# Patient Record
Sex: MALE | Race: WHITE | NOT HISPANIC OR LATINO | URBAN - METROPOLITAN AREA
[De-identification: names, ages, dates, MRNs, and addresses within clinical notes are randomized per-mention and may not be internally consistent; named-entity substitution may affect disease eponyms.]

---

## 2018-01-01 ENCOUNTER — HOSPITAL ENCOUNTER (INPATIENT)
Dept: INTENSIVE CARE | Facility: HOSPITAL | Age: 83
End: 2018-08-15
Attending: INTERNAL MEDICINE | Admitting: INTERNAL MEDICINE

## 2018-01-01 ENCOUNTER — RECORDS - HEALTHEAST (OUTPATIENT)
Dept: LAB | Facility: CLINIC | Age: 83
End: 2018-01-01

## 2018-01-01 DIAGNOSIS — N17.9 ACUTE KIDNEY INJURY (H): ICD-10-CM

## 2018-01-01 DIAGNOSIS — E87.5 HYPERKALEMIA: ICD-10-CM

## 2018-01-01 DIAGNOSIS — I95.9 HYPOTENSION, UNSPECIFIED HYPOTENSION TYPE: ICD-10-CM

## 2018-01-01 DIAGNOSIS — R00.0 WIDE-COMPLEX TACHYCARDIA: ICD-10-CM

## 2018-01-01 DIAGNOSIS — J96.90 RESPIRATORY FAILURE (H): ICD-10-CM

## 2018-01-01 LAB
ALBUMIN SERPL-MCNC: 1.8 G/DL (ref 3.5–5)
ALBUMIN SERPL-MCNC: 2 G/DL (ref 3.5–5)
ALBUMIN SERPL-MCNC: 2.6 G/DL (ref 3.5–5)
ALP SERPL-CCNC: 219 U/L (ref 45–120)
ALP SERPL-CCNC: 251 U/L (ref 45–120)
ALP SERPL-CCNC: 614 U/L (ref 45–120)
ALT SERPL W P-5'-P-CCNC: 1654 U/L (ref 0–45)
ALT SERPL W P-5'-P-CCNC: 2210 U/L (ref 0–45)
ALT SERPL W P-5'-P-CCNC: 3183 U/L (ref 0–45)
ANION GAP SERPL CALCULATED.3IONS-SCNC: 16 MMOL/L (ref 5–18)
ANION GAP SERPL CALCULATED.3IONS-SCNC: 20 MMOL/L (ref 5–18)
ANION GAP SERPL CALCULATED.3IONS-SCNC: 26 MMOL/L (ref 5–18)
ANION GAP SERPL CALCULATED.3IONS-SCNC: >28 MMOL/L (ref 5–18)
AST SERPL W P-5'-P-CCNC: 3830 U/L (ref 0–40)
AST SERPL W P-5'-P-CCNC: 6117 U/L (ref 0–40)
AST SERPL W P-5'-P-CCNC: ABNORMAL U/L (ref 0–40)
ATRIAL RATE - MUSE: 234 BPM
ATRIAL RATE - MUSE: 256 BPM
BASE EXCESS BLDA CALC-SCNC: -11.7 MMOL/L
BASE EXCESS BLDA CALC-SCNC: -21.5 MMOL/L
BASE EXCESS BLDA CALC-SCNC: -24.8 MMOL/L
BASE EXCESS BLDA CALC-SCNC: -25.1 MMOL/L
BASE EXCESS BLDV CALC-SCNC: -5.6 MMOL/L
BILIRUB DIRECT SERPL-MCNC: 0.9 MG/DL
BILIRUB DIRECT SERPL-MCNC: 1 MG/DL
BILIRUB SERPL-MCNC: 1.4 MG/DL (ref 0–1)
BILIRUB SERPL-MCNC: 1.6 MG/DL (ref 0–1)
BILIRUB SERPL-MCNC: 1.9 MG/DL (ref 0–1)
BUN SERPL-MCNC: 22 MG/DL (ref 8–28)
BUN SERPL-MCNC: 31 MG/DL (ref 8–28)
BUN SERPL-MCNC: 68 MG/DL (ref 8–28)
BUN SERPL-MCNC: 75 MG/DL (ref 8–28)
CALCIUM SERPL-MCNC: 7.4 MG/DL (ref 8.5–10.5)
CALCIUM SERPL-MCNC: 7.7 MG/DL (ref 8.5–10.5)
CALCIUM SERPL-MCNC: 8.2 MG/DL (ref 8.5–10.5)
CALCIUM SERPL-MCNC: 9.7 MG/DL (ref 8.5–10.5)
CHLORIDE BLD-SCNC: 89 MMOL/L (ref 98–107)
CHLORIDE BLD-SCNC: 97 MMOL/L (ref 98–107)
CHLORIDE BLD-SCNC: 98 MMOL/L (ref 98–107)
CHLORIDE BLD-SCNC: 99 MMOL/L (ref 98–107)
CK SERPL-CCNC: 1459 U/L (ref 30–190)
CO2 SERPL-SCNC: 20 MMOL/L (ref 22–31)
CO2 SERPL-SCNC: 7 MMOL/L (ref 22–31)
CO2 SERPL-SCNC: 8 MMOL/L (ref 22–31)
CO2 SERPL-SCNC: <6 MMOL/L (ref 22–31)
COHGB MFR BLD: 91.6 % (ref 95–96)
COHGB MFR BLD: 95.4 % (ref 95–96)
COHGB MFR BLD: 98 % (ref 95–96)
COHGB MFR BLD: 99.6 % (ref 95–96)
CREAT SERPL-MCNC: 1.07 MG/DL (ref 0.7–1.3)
CREAT SERPL-MCNC: 2.25 MG/DL (ref 0.7–1.3)
CREAT SERPL-MCNC: 2.76 MG/DL (ref 0.7–1.3)
CREAT SERPL-MCNC: 3.14 MG/DL (ref 0.7–1.3)
DIASTOLIC BLOOD PRESSURE - MUSE: NORMAL MMHG
DIASTOLIC BLOOD PRESSURE - MUSE: NORMAL MMHG
ERYTHROCYTE [DISTWIDTH] IN BLOOD BY AUTOMATED COUNT: 17.8 % (ref 11–14.5)
ERYTHROCYTE [DISTWIDTH] IN BLOOD BY AUTOMATED COUNT: 18.6 % (ref 11–14.5)
GFR SERPL CREATININE-BSD FRML MDRD: 19 ML/MIN/1.73M2
GFR SERPL CREATININE-BSD FRML MDRD: 22 ML/MIN/1.73M2
GFR SERPL CREATININE-BSD FRML MDRD: 28 ML/MIN/1.73M2
GFR SERPL CREATININE-BSD FRML MDRD: >60 ML/MIN/1.73M2
GLUCOSE BLD-MCNC: 176 MG/DL (ref 70–125)
GLUCOSE BLD-MCNC: 44 MG/DL (ref 70–125)
GLUCOSE BLD-MCNC: 56 MG/DL (ref 70–125)
GLUCOSE BLD-MCNC: 84 MG/DL (ref 70–125)
GLUCOSE BLDC GLUCOMTR-MCNC: 101 MG/DL
GLUCOSE BLDC GLUCOMTR-MCNC: 116 MG/DL
GLUCOSE BLDC GLUCOMTR-MCNC: 121 MG/DL
GLUCOSE BLDC GLUCOMTR-MCNC: 125 MG/DL
GLUCOSE BLDC GLUCOMTR-MCNC: 127 MG/DL
GLUCOSE BLDC GLUCOMTR-MCNC: 140 MG/DL
GLUCOSE BLDC GLUCOMTR-MCNC: 153 MG/DL
GLUCOSE BLDC GLUCOMTR-MCNC: 157 MG/DL
GLUCOSE BLDC GLUCOMTR-MCNC: 44 MG/DL
GLUCOSE BLDC GLUCOMTR-MCNC: 46 MG/DL
GLUCOSE BLDC GLUCOMTR-MCNC: 66 MG/DL
GLUCOSE BLDC GLUCOMTR-MCNC: 74 MG/DL
GLUCOSE BLDC GLUCOMTR-MCNC: 92 MG/DL
GLUCOSE BLDC GLUCOMTR-MCNC: 95 MG/DL
HBA1C MFR BLD: 6.6 % (ref 4.2–6.1)
HBV SURFACE AG SERPL QL IA: NEGATIVE
HCO3, ARTERIAL CALC - HISTORICAL: 15.7 MMOL/L (ref 23–29)
HCO3, ARTERIAL CALC - HISTORICAL: 5.3 MMOL/L (ref 23–29)
HCO3, ARTERIAL CALC - HISTORICAL: 5.6 MMOL/L (ref 23–29)
HCO3, ARTERIAL CALC - HISTORICAL: 8.2 MMOL/L (ref 23–29)
HCO3, VENOUS, CALC - HISTORICAL: 19.4 MMOL/L (ref 24–30)
HCT VFR BLD AUTO: 30.1 % (ref 40–54)
HCT VFR BLD AUTO: 41 % (ref 40–54)
HGB BLD-MCNC: 13 G/DL (ref 14–18)
HGB BLD-MCNC: 7.5 G/DL (ref 14–18)
HGB BLD-MCNC: 9.5 G/DL (ref 14–18)
INR PPP: 2.31 (ref 0.9–1.1)
INR PPP: 2.68 (ref 0.9–1.1)
INR PPP: 4.46 (ref 0.9–1.1)
INR PPP: 7.02 (ref 0.9–1.1)
INTERPRETATION ECG - MUSE: NORMAL
INTERPRETATION ECG - MUSE: NORMAL
LACTATE SERPL-SCNC: 12.2 MMOL/L (ref 0.5–2.2)
LACTATE SERPL-SCNC: 14.3 MMOL/L (ref 0.5–2.2)
LACTATE SERPL-SCNC: 25.5 MMOL/L (ref 0.5–2.2)
LACTATE SERPL-SCNC: 9.6 MMOL/L (ref 0.5–2.2)
MAGNESIUM SERPL-MCNC: 2.6 MG/DL (ref 1.8–2.6)
MAGNESIUM SERPL-MCNC: 3.5 MG/DL (ref 1.8–2.6)
MCH RBC QN AUTO: 26.5 PG (ref 27–34)
MCH RBC QN AUTO: 26.7 PG (ref 27–34)
MCHC RBC AUTO-ENTMCNC: 31.6 G/DL (ref 32–36)
MCHC RBC AUTO-ENTMCNC: 31.7 G/DL (ref 32–36)
MCV RBC AUTO: 84 FL (ref 80–100)
MCV RBC AUTO: 84 FL (ref 80–100)
O2/TOTAL GAS SETTING VFR VENT: 0.5 %
O2/TOTAL GAS SETTING VFR VENT: 0.7 %
O2/TOTAL GAS SETTING VFR VENT: 100 %
O2/TOTAL GAS SETTING VFR VENT: 70 %
OXYHEMOGLOBIN (VBG) - HISTORICAL: 36.6 % (ref 70–75)
OXYHEMOGLOBIN - HISTORICAL: 89.9 % (ref 95–96)
OXYHEMOGLOBIN - HISTORICAL: 93.3 % (ref 95–96)
OXYHEMOGLOBIN - HISTORICAL: 95.7 % (ref 95–96)
OXYHEMOGLOBIN - HISTORICAL: 98 % (ref 95–96)
OXYHGB MFR BLDV: 37 % (ref 70–75)
P AXIS - MUSE: NORMAL DEGREES
P AXIS - MUSE: NORMAL DEGREES
PCO2 BLD: 25 MM HG (ref 35–45)
PCO2 BLD: 28 MM HG (ref 35–45)
PCO2 BLD: 32 MM HG (ref 35–45)
PCO2 BLD: 37 MM HG (ref 35–45)
PCO2 BLDV: 62 MM HG (ref 35–50)
PEEP: 10 CM H2O
PEEP: 5 CM H2O
PH BLD: 6.91 [PH] (ref 7.37–7.44)
PH BLD: 6.94 [PH] (ref 7.37–7.44)
PH BLD: 7.06 [PH] (ref 7.37–7.44)
PH BLD: 7.22 [PH] (ref 7.37–7.44)
PH BLDV: 7.18 [PH] (ref 7.35–7.45)
PLATELET # BLD AUTO: 141 THOU/UL (ref 140–440)
PLATELET # BLD AUTO: 260 THOU/UL (ref 140–440)
PMV BLD AUTO: 10.3 FL (ref 8.5–12.5)
PMV BLD AUTO: 11.1 FL (ref 8.5–12.5)
PO2 BLD: 326 MM HG (ref 75–85)
PO2 BLD: 61 MM HG (ref 75–85)
PO2 BLD: 92 MM HG (ref 75–85)
PO2 BLD: 92 MM HG (ref 75–85)
PO2 BLDV: 27 MM HG (ref 25–47)
POTASSIUM BLD-SCNC: 4.1 MMOL/L (ref 3.5–5)
POTASSIUM BLD-SCNC: 6.6 MMOL/L (ref 3.5–5)
POTASSIUM BLD-SCNC: 7.3 MMOL/L (ref 3.5–5)
POTASSIUM BLD-SCNC: 7.4 MMOL/L (ref 3.5–5)
PR INTERVAL - MUSE: NORMAL MS
PR INTERVAL - MUSE: NORMAL MS
PROCALCITONIN SERPL-MCNC: 1.8 NG/ML (ref 0–0.49)
PROT SERPL-MCNC: 3.5 G/DL (ref 6–8)
PROT SERPL-MCNC: 4.8 G/DL (ref 6–8)
PROT SERPL-MCNC: 4.9 G/DL (ref 6–8)
QRS DURATION - MUSE: 100 MS
QRS DURATION - MUSE: 132 MS
QT - MUSE: 416 MS
QT - MUSE: 434 MS
QTC - MUSE: 468 MS
QTC - MUSE: 525 MS
R AXIS - MUSE: 149 DEGREES
R AXIS - MUSE: 51 DEGREES
RATE: 16 RR/MIN
RATE: 16 RR/MIN
RATE: 20 RR/MIN
RATE: 22 RR/MIN
RBC # BLD AUTO: 3.59 MILL/UL (ref 4.4–6.2)
RBC # BLD AUTO: 4.87 MILL/UL (ref 4.4–6.2)
SODIUM SERPL-SCNC: 123 MMOL/L (ref 136–145)
SODIUM SERPL-SCNC: 126 MMOL/L (ref 136–145)
SODIUM SERPL-SCNC: 131 MMOL/L (ref 136–145)
SODIUM SERPL-SCNC: 134 MMOL/L (ref 136–145)
SYSTOLIC BLOOD PRESSURE - MUSE: NORMAL MMHG
SYSTOLIC BLOOD PRESSURE - MUSE: NORMAL MMHG
T AXIS - MUSE: 247 DEGREES
T AXIS - MUSE: 58 DEGREES
TEMPERATURE: 37 DEGREES C
TROPONIN I SERPL-MCNC: 0.03 NG/ML (ref 0–0.29)
TSH SERPL DL<=0.005 MIU/L-ACNC: 0.87 UIU/ML (ref 0.3–5)
VANCOMYCIN SERPL-MCNC: 14.8 UG/ML
VENTILATION MODE: ABNORMAL
VENTILATOR TIDAL VOLUME: 600 ML
VENTRICULAR RATE- MUSE: 76 BPM
VENTRICULAR RATE- MUSE: 88 BPM
WBC: 13.5 THOU/UL (ref 4–11)
WBC: 3.1 THOU/UL (ref 4–11)

## 2018-01-01 RX ORDER — PRAVASTATIN SODIUM 10 MG
10 TABLET ORAL AT BEDTIME
Status: SHIPPED | COMMUNITY
Start: 2018-01-01

## 2018-01-01 RX ORDER — BUDESONIDE AND FORMOTEROL FUMARATE DIHYDRATE 160; 4.5 UG/1; UG/1
2 AEROSOL RESPIRATORY (INHALATION) 2 TIMES DAILY
Status: SHIPPED | COMMUNITY
Start: 2018-01-01

## 2018-01-01 RX ORDER — ACETAMINOPHEN 325 MG/1
650 TABLET ORAL EVERY 4 HOURS PRN
Status: SHIPPED | COMMUNITY
Start: 2018-01-01

## 2018-01-01 RX ORDER — TACROLIMUS 1 MG/G
1 OINTMENT TOPICAL 2 TIMES DAILY PRN
Status: SHIPPED | COMMUNITY
Start: 2018-01-01

## 2018-01-01 RX ORDER — WARFARIN SODIUM 1 MG/1
1 TABLET ORAL DAILY
Status: SHIPPED | COMMUNITY
Start: 2018-01-01

## 2018-01-01 RX ORDER — ALBUTEROL SULFATE 0.83 MG/ML
2.5 SOLUTION RESPIRATORY (INHALATION) EVERY 4 HOURS PRN
Status: SHIPPED | COMMUNITY
Start: 2018-01-01

## 2018-01-01 RX ORDER — DOCUSATE SODIUM 100 MG/1
100 CAPSULE, LIQUID FILLED ORAL 2 TIMES DAILY PRN
Status: SHIPPED | COMMUNITY
Start: 2018-01-01

## 2018-01-01 RX ORDER — CITALOPRAM HYDROBROMIDE 10 MG/1
10 TABLET ORAL EVERY MORNING
Status: SHIPPED | COMMUNITY
Start: 2018-01-01

## 2018-01-01 RX ORDER — LEVOTHYROXINE SODIUM 125 UG/1
125 TABLET ORAL DAILY
Status: SHIPPED | COMMUNITY
Start: 2018-01-01

## 2018-01-01 RX ORDER — HYDROCODONE BITARTRATE AND ACETAMINOPHEN 5; 325 MG/1; MG/1
1 TABLET ORAL EVERY 4 HOURS PRN
Status: SHIPPED | COMMUNITY
Start: 2018-01-01

## 2018-01-01 RX ORDER — DEXTROMETHORPHAN HBR. AND GUAIFENESIN 10; 100 MG/5ML; MG/5ML
10 SOLUTION ORAL EVERY 4 HOURS PRN
Status: SHIPPED | COMMUNITY
Start: 2018-01-01

## 2018-01-01 RX ORDER — DILTIAZEM HYDROCHLORIDE 120 MG/1
120 CAPSULE, COATED, EXTENDED RELEASE ORAL DAILY
Status: SHIPPED | COMMUNITY
Start: 2018-01-01

## 2018-01-01 RX ORDER — PANTOPRAZOLE SODIUM 40 MG/1
40 TABLET, DELAYED RELEASE ORAL 2 TIMES DAILY
Status: SHIPPED | COMMUNITY
Start: 2018-01-01

## 2018-01-01 RX ORDER — ISOSORBIDE MONONITRATE 60 MG/1
60 TABLET, EXTENDED RELEASE ORAL DAILY
Status: SHIPPED | COMMUNITY
Start: 2018-01-01

## 2018-08-18 LAB
ATRIAL RATE - MUSE: 182 BPM
DIASTOLIC BLOOD PRESSURE - MUSE: NORMAL MMHG
INTERPRETATION ECG - MUSE: NORMAL
P AXIS - MUSE: NORMAL DEGREES
PR INTERVAL - MUSE: NORMAL MS
QRS DURATION - MUSE: 126 MS
QT - MUSE: 178 MS
QTC - MUSE: 309 MS
R AXIS - MUSE: 63 DEGREES
SYSTOLIC BLOOD PRESSURE - MUSE: NORMAL MMHG
T AXIS - MUSE: -80 DEGREES
VENTRICULAR RATE- MUSE: 182 BPM

## 2018-08-19 LAB
AEROBIC BLOOD CULTURE BOTTLE: NO GROWTH
ANAEROBIC BLOOD CULTURE BOTTLE: NO GROWTH

## 2018-08-21 LAB
AEROBIC BLOOD CULTURE BOTTLE: NO GROWTH
ANAEROBIC BLOOD CULTURE BOTTLE: NO GROWTH

## 2021-06-01 VITALS — HEIGHT: 70 IN | BODY MASS INDEX: 25.48 KG/M2 | WEIGHT: 178 LBS

## 2021-06-16 PROBLEM — E87.5 HYPERKALEMIA: Status: ACTIVE | Noted: 2018-01-01

## 2021-06-19 NOTE — PROCEDURES
ARTERIAL LINE INSERTION PROCEDURE NOTE  (NON-OR)    Procedure Date:  8/15/2018   Performing Physician:  Brown Marrero    Pre-Procedure Diagnosis:     SEPTIC SHOCK and RESPIRATORY FAILURE  Post-Procedure Diagnosis:  Same as Pre-Procedure Diagnosis    Procedure:  Arterial line insertion  Left Radial  Indications:  HYPOTENSION and RESPIRATORY FAILURE      Estimated Blood Loss: Minimal   Complications: none      Procedure Details:   The risks, benefits, complications, treatment options, and expected outcomes were discussed with the patient's wife. The risks and potential complications of their problem and purposed procedure include but are not limited to infection, bleeding, pain,  the need for additional procedures, and nerve and vessel injury. The patient's wife concurred with the proposed plan, giving informed consent.  The site of the procedure was properly noted/marked. The patient was identified as Scot Jaime with Date of Birth 3/15/1932 and the procedure verified as Arterial Line Insertion.  A Time Out was held and the above information confirmed.    In sterile fashion, the line site was prepped with chlorhexidine.  Strict sterile conditions were maintained,  Cap, mask, and sterile gloves were worn by all participants.  Using continuous ultrasound guidance with a sterile ultrasound probe, the arterial line was placed in the Left Radial artery percutaneously,  without  difficulty.  The line was sutured in place and an occlusive sterile dressing was applied.  The total number of needle stick attempts was 1.      Condition: unchanged    Brown Marrero MD  Pulmonary and Critical Care Medicine  [facility]  Cell [phone]  Office [phone]  Pager [phone]

## 2021-06-19 NOTE — PROGRESS NOTES
Respiratory Therapy  Extubation Date 08/15/2018,  Extubation Time 14:06  Patient terminally extubated.     Ishan Boo, LRT

## 2021-06-19 NOTE — PROGRESS NOTES
Pharmacy Consult: Vancomycin Dosing in the Emergency Department    Pharmacist consulted by Dr. Sharma to dose vancomycin for Scot Jaime, a 86 y.o. male.    Indication for vancomycin therapy: sepsis    Other current antimicrobials             piperacillin-tazobactam 3.375 g in NaCl 0.9 % 50 mL (MINI-BAG Plus) (ZOSYN)  Once          vancomycin 1,500 mg in sodium chloride 0.9% 500 mL (VANCOCIN)  Once             Assessment/Plan    1. Vancomycin 1500 mg IV once in the ED (~20 mg/kg actual body weight).  2. If the patient is admitted to the hospital and vancomycin therapy should continue, please re-consult pharmacy.    Subjective/Objective    Scot Jaime presented to the ED on 8/14/2018 for Altered Mental Status and Chest Pain    Height:    Actual Body Weight (ABW): 80.7 kg (178 lb)    Patient height not recorded    BMI: There is no height or weight on file to calculate BMI.    Allergies   Allergen Reactions     Etodolac-Capsaicin-Me-Sal-Men      Lisinopril      Neomycin      Polymyxin B      Simvastatin        Patient Active Problem List   Diagnosis     Hyperkalemia    No past medical history on file.     There were no vitals filed for this visit.    Recent Labs      08/14/18 1915   WBC  13.5*   LACTICACID  14.3*     Recent Labs      08/14/18 1915   BUN  75*   CREATININE  3.14*       CrCl cannot be calculated (Unknown ideal weight.).    Thank you for the consult,  Ericka Louie, PharmD  8/14/2018  8:54 PM

## 2021-06-19 NOTE — PROCEDURES
"PICC Line Insertion Procedure Note  Pt. Name: Scot Jaime  MRN:        [id number]    Procedure: Insertion of a  Triple Lumen  5 fr  Bard SOLO (valved) Power PICC, Lot number [id number]    Indications: Pressors    Contraindications : none    Procedure Details   Patient identified with 2 identifiers and \"Time Out\" conducted.  .     Central line insertion bundle followed: hand hygeine performed prior to procedure, site cleansed with cholraprep, hat, mask, sterile gloves,sterile gown worn, patient draped with maximum barrier head to toe drape, sterile field maintained.    The vein was assessed and found to be compressible and of adequate size. 0 ml 1% Lidocaine administered sq to the insertion site. A 5 Fr PICC was inserted into the basilic vein of the right arm with ultrasound guidance. 1 attempt(s) required to access vein.   Catheter threaded without difficulty. Good blood return noted.    Modified Seldinger Technique used for insertion.    The 8 sharps that are included in the PICC insertion kit were accounted for and disposed of in the sharps container prior to breakdown of the sterile field.    Catheter secured with Statlock, biopatch and Tegaderm dressing applied.    Findings:  Total catheter length  49 cm, with 9 cm exposed. Mid upper arm circumference is 31 cm. Catheter was flushed with 30 cc NS. Patient  tolerated procedure well.    Tip placement verified by xray. Xray read by Dr. Phalen . Tip placement in the caval atrial junction .    CLABSI prevention brochure left at bedside.    Patient's primary RN notified PICC is ready for use.    Comments:          Anca Oconnell, RN    [facility]  [phone]      "

## 2021-06-19 NOTE — CONSULTS
[facility] - IR    Non-tunneled HD catheter placement placed 8/14/18 at 2300 per Dr. Warner.     Neal Ferreira, APRN, CNP

## 2021-06-19 NOTE — ED PROVIDER NOTES
eMERGENCY dEPARTMENT eNCOUnter      CHIEF COMPLAINT    Chief Complaint   Patient presents with     Altered Mental Status     Chest Pain       HPI    Scot Jaime is a 86 y.o. male with a history of afib who presents with altered mental state and chest pain. The pt was brought in via EMS and was accompanied by his grandson after arrival. According to the pt's grandson the pt ate dinner and was having difficulties after eating, he would cough with drinking water and appeared generally uncomfortable. The pt later was sweaty and complained of tight chest pains and shortness of breath. The grandson also noted that the pt has a chest tube for effusion that is supposed to be drained everyday, but has not been in 2 days. According to EMT the pt was picked up from a care facility and at the time complained of chest pain and shortness of breath. The EMT also noted that he was trying to talk initially but became unresponsive on he ride over. EMS did not administer medication en route. Blood pressure was low (EMS measured 50/32), but is continually improving after arrival.    The creation of this record is based on the scribe s observations of the work being performed by Wagner Barrios MD and the provider s statements to them. It was created on his behalf by Ana Rosa, a trained medical scribe. This document has been checked and approved by the attending provider.    PAST MEDICAL HISTORY    Relevant past medical history reviewed with patient, unless otherwise stated in HPI, history not pertinent to this visit.       SURGICAL HISTORY    Relevant past surgical history reviewed with patient, unless otherwise stated in HPI, history not pertinent to this visit.      CURRENT MEDICATIONS    Current Discharge Medication List      CONTINUE these medications which have NOT CHANGED    Details   acetaminophen (TYLENOL) 325 MG tablet Take 650 mg by mouth every 4 (four) hours as needed for pain.      albuterol (PROVENTIL) 2.5 mg  /3 mL (0.083 %) nebulizer solution Take 2.5 mg by nebulization every 4 (four) hours as needed for wheezing.      budesonide-formoterol (SYMBICORT) 160-4.5 mcg/actuation inhaler Inhale 2 puffs 2 (two) times a day.      calcium carbonate-vitamin D3 (CALCIUM WITH VITAMIN D) 600 mg(1,500mg) -400 unit per tablet Take 1 tablet by mouth daily.      citalopram (CELEXA) 10 MG tablet Take 10 mg by mouth every morning.      dextromethorphan-guaiFENesin (ROBITUSSIN-DM)  mg/5 mL liquid Take 10 mL by mouth every 4 (four) hours as needed.      diltiazem (CARDIZEM CD) 120 MG 24 hr capsule Take 120 mg by mouth daily.      docusate sodium (COLACE) 100 MG capsule Take 100 mg by mouth 2 (two) times a day as needed for constipation.      enzalutamide 40 mg cap Take 160 mg by mouth every evening.      HYDROcodone-acetaminophen 5-325 mg per tablet Take 1 tablet by mouth every 4 (four) hours as needed for pain.      isosorbide mononitrate (IMDUR) 60 MG 24 hr tablet Take 60 mg by mouth daily.      levothyroxine (SYNTHROID, LEVOTHROID) 125 MCG tablet Take 125 mcg by mouth Daily at 6:00 am.      menthol 4 % Gel Apply 1 application topically 2 (two) times a day as needed.      multivitamin therapeutic tablet Take 1 tablet by mouth daily.      pantoprazole (PROTONIX) 40 MG tablet Take 40 mg by mouth 2 (two) times a day.      pravastatin (PRAVACHOL) 10 MG tablet Take 10 mg by mouth at bedtime.      senna (SENOKOT) 8.6 mg tablet Take 2 tablets by mouth 2 (two) times a day.      tacrolimus (PROTOPIC) 0.1 % ointment Apply 1 application topically 2 (two) times a day as needed.      warfarin (COUMADIN) 1 MG tablet Take 1 mg by mouth daily. 1 mg daily from 8/13 to 8/19 then check INR             ALLERGIES    Allergies   Allergen Reactions     Etodolac-Capsaicin-Me-Sal-Men      Lisinopril      Neomycin      Polymyxin B      Simvastatin        FAMILY HISTORY    Relevant family history reviewed with patient, unless otherwise stated in HPI, history  not pertinent to this visit.    SOCIAL HISTORY    Social History     Social History     Marital status:      Spouse name: N/A     Number of children: N/A     Years of education: N/A     Social History Main Topics     Smoking status: Not on file     Smokeless tobacco: Not on file     Alcohol use Not on file     Drug use: Not on file     Sexual activity: Not on file     Other Topics Concern     Not on file     Social History Narrative       REVIEW OF SYSTEMS      Unobtainable due to altered mental state and unstable condition.     PHYSICAL EXAM    VITAL SIGNS: BP 94/52  Pulse 82  Resp (!) 33  Wt 178 lb (80.7 kg)  SpO2 100%   Constitutional: Unresponsive, taking spontaneous, shallow breaths  HENT:  Normocephalic, Atraumatic, Bilateral external ears normal, Oropharynx moist, Nose normal. Neck- Supple, No stridor.   Eyes:  PERRL with slight irregularity to the left pupil but equally responsive, Conjunctiva normal, No discharge.   Respiratory: Shallow breaths with coarse breath sounds bilaterally, no wheezing  Cardiovascular: Tachycardia with a regular rhythm, No appreciable rubs or gallops.   GI:  Soft, No tenderness, No distension, No palpable or pulsatile masses  Musculoskeletal:  Intact distal pulses and good capillary refill, No edema. No major deformities noted.   Integument:  Warm, Dry, No erythema, No rash.   Neurologic: GCS of 3, patient was nonverbal, eyes were open but not voluntarily, did not respond to noxious stimuli      EKG    Wide-complex tachycardia, no specific ST ischemic changes, no priors for comparison    Repeat ECG showed atrial flutter with variable AV block, no specific ischemic changes, did note QTC of 525 ms    I have independently reviewed and interpreted the above EKG, pending the final cardiology read.    RADIOLOGY    Please see official radiology report.  Xr Chest 1 View Portable    Result Date: 8/14/2018  XR CHEST 1 VIEW PORTABLE 8/14/2018 6:52 PM INDICATION: S/p intubation  COMPARISON: None. FINDINGS: Cardiomegaly. Mild central vascular congestion. Small right pleural effusion and right basilar atelectasis. Endotracheal tube tip in satisfactory position at the level of the clavicles. Right-sided chest tube extends to the apex. No pneumothorax.    Xr Chest 1 View For Picc Placement Portable    Result Date: 8/14/2018  XR CHEST 1 VIEW FOR PICC LINE PLACEMENT PORTABLE 8/14/2018 10:12 PM INDICATION: Verify catheter placement COMPARISON: None. FINDINGS: Right PICC with tip at the cavoatrial junction in good location. Mild worsening interstitial infiltrate right lung with small right pleural effusion. Stable right chest tube and endotracheal tube.      I have independently reviewed and interpreted the above imaging findings, pending the final radiology read.    LABS  Results for orders placed or performed during the hospital encounter of 08/14/18   Basic Metabolic Panel   Result Value Ref Range    Sodium 123 (L) 136 - 145 mmol/L    Potassium 7.3 (HH) 3.5 - 5.0 mmol/L    Chloride 89 (L) 98 - 107 mmol/L    CO2 8 (LL) 22 - 31 mmol/L    Anion Gap, Calculation 26 (H) 5 - 18 mmol/L    Glucose 44 (LL) 70 - 125 mg/dL    Calcium 9.7 8.5 - 10.5 mg/dL    BUN 75 (H) 8 - 28 mg/dL    Creatinine 3.14 (H) 0.70 - 1.30 mg/dL    GFR MDRD Af Amer 23 (L) >60 mL/min/1.73m2    GFR MDRD Non Af Amer 19 (L) >60 mL/min/1.73m2   Lactic Acid   Result Value Ref Range    Lactic Acid 14.3 (HH) 0.5 - 2.2 mmol/L   HM2 (CBC W/O DIFF)   Result Value Ref Range    WBC 13.5 (H) 4.0 - 11.0 thou/uL    RBC 4.87 4.40 - 6.20 mill/uL    Hemoglobin 13.0 (L) 14.0 - 18.0 g/dL    Hematocrit 41.0 40.0 - 54.0 %    MCV 84 80 - 100 fL    MCH 26.7 (L) 27.0 - 34.0 pg    MCHC 31.7 (L) 32.0 - 36.0 g/dL    RDW 18.6 (H) 11.0 - 14.5 %    Platelets 260 140 - 440 thou/uL    MPV 11.1 8.5 - 12.5 fL   Troponin I   Result Value Ref Range    Troponin I 0.03 0.00 - 0.29 ng/mL   Blood Gases, Arterial   Result Value Ref Range    pH, Arterial 6.94 (LL) 7.37 -  7.44    pCO2, Arterial 28 (L) 35 - 45 mm Hg    pO2, Arterial 326 (H) 75 - 85 mm Hg    Bicarbonate, Arterial Calc 5.6 (L) 23.0 - 29.0 mmol/L    O2 Sat, Arterial 99.6 (H) 95.0 - 96.0 %    Oxyhemoglobin 98.0 (H) 95.0 - 96.0 %    Base Excess, Arterial Calc -24.8 mmol/L    Ventilation Mode Ventilator     Rate 16 rr/min    FIO2 100.00     Peep 5 cm H2O    Sample Stabilized Temperature 37.0 degrees C    Ventilator Tidal Volume 600 mL   INR   Result Value Ref Range    INR 4.46 (H) 0.90 - 1.10   Thyroid Stimulating Hormone (TSH)   Result Value Ref Range    TSH 0.87 0.30 - 5.00 uIU/mL   Magnesium   Result Value Ref Range    Magnesium 3.5 (H) 1.8 - 2.6 mg/dL   POCT Glucose   Result Value Ref Range    Glucose,  mg/dL   POCT Glucose   Result Value Ref Range    Glucose, POC 46 mg/dL   POCT Glucose   Result Value Ref Range    Glucose, POC 92 mg/dL         PROCEDURES    CRITICAL CARE NOTE:  Indication: Acute respiratory failure, altered mental status, tachycardia, hypotension  Interventions: Patient was placed on immediate pulse oximetry and cardiac telemetry.  Large-bore IV access was obtained.  Patient had an irregular wide complex tachycardia we were unable to obtain initial pulse oximetry reading and the patient was hypotensive.  Patient had a GCS of 3 and concern for airway protection, RSI with endotracheal intubation was performed for securing the airway.  Patient was given large bolus of IV fluids with initial improvement in blood pressure.  Heart rate was irregular and variable, return to regular rate but again wide-complex.  Bedside ultrasound performed showed good global cardiac contractility with no significantly dilated right ventricle, pericardial effusion.  FAST exam was also performed with no significantly dilated abdominal aorta or intra-abdominal fluid.  Continued to have quite a bit of difficulty obtaining reliable pulse oximetry but finally we are able to obtain good oxygen saturations above 90, heart rate  continued to be within a reasonable rate, no significant tachycardia.  Blood pressure unfortunately continued to deteriorate and we did initiate vasopressors through peripheral IV.  Patient had family initially present at bedside and there was a very short and quick discussion regarding CODE STATUS and the patient's wishes, family confirmed with me that he was full code and this is when we move forward with the intubation and aggressive IV fluid resuscitation, the patient never lost pulses here in the emergency department.  We did obtain screening labs and portable chest x-ray.  Portable chest x-ray reported no acute concerning findings or laboratory studies concerning for acute kidney injury and hyperkalemia, troponin negative and serial ECG showed no acute ischemic changes.  INR was elevated, again the patient had no outward signs of trauma, considered intracranial bleed but due to the patient's instability we are unable to obtain CT imaging.  Symptoms and ECG findings consistent with his hyperkalemia and this is likely the source of the patient's deterioration.  We initiated treatment for hyperkalemia with dextrose, insulin, calcium gluconate, albuterol and bicarb.  Lactate also elevated, concern for infectious etiology we did obtain blood cultures and initiated broad-spectrum antibiotics.  Spoke with the intensivist and on-call nephrology, plan at this time is to admit to the ICU for continued management and likely dialysis.  Discussed all these findings recommendations with patient and family, his wife was present at the bedside and did provide written consent for placement of a PICC line.  Patient was admitted in stable condition.  Treatments: IV fluids, IV calcium gluconate, dextrose, insulin, albuterol, bicarb, norepinephrine infusion, endotracheal intubation, nephrology consultation  Critical Care time excluding procedures: 120 minutes    Procedure: Endotracheal intubation.   Indication: Acute respiratory  failure  Description: Induction agent: etomidate, Paralytic agent:rocuronium Preoxygenation of patient was provided. The patient was -placed on continuous cardiac as well as pulse oximetry monitoring. Glidescope was used to directly visualize the cords. A 7 mm cuffed Endotracheal tube was visualized advancing between the cords to a level of 23 cm at the lip. The stylette was then were removed and discarded. Tube placement was noted by fogging in the tube, equal bilateral breath sounds, absence of breath sounds over the epigastrium and positive color change with end tidal colorimetric monitoring. The endotracheal tube cuff was then inflated with appropriate amount of air and securely held in place with commercial tube reyes. The patient was then connected to the ventilator. A portable chest x-ray has been ordered for placement.         ED COURSE & MEDICAL DECISION MAKING    Again, the patient is a 86 y.o. male who presents with acute respiratory failure and altered mental status.  Patient was unstable at time of presentation, patient was placed on pulse oximetry and cardiac telemetry, endotracheal intubation was performed to secure airway.  IV fluids were provided, labs and portable chest x-ray obtained.  Labs significant for hyperkalemia which was treated with calcium gluconate, bicarb, insulin, dextrose and albuterol.  My suspicion is for acute kidney injury leading to the hyperkalemia and the patient's overall decompensation.  Chest x-ray reported no other acute concerning findings at this time, unable to obtain further imaging studies secondary to patient's unstable state however there was no reports of any trauma he had no outward signs of trauma, bedside fast exam was benign.  Patient will be admitted to the ICU for continued management, nephrology made aware of the patient's acute kidney injury and hyperkalemia.  Discussed all these findings recognitions with the patient's family who are present at bedside  and agree with the care plan.  Also, spoke with the hospitalist in regard to the patient case and admission to the ICU.    Pertinent Labs & Imaging studies reviewed. (See chart for details)    8:09 PM Spoke with Dr. Amado, the intensivist on call.   8:35 PM bedside ultrasound fast exam performed.   8:48 PM spoke with intensivist on call, Dr. Amado.   8:58 PM spoke to nephrology, Dr. Lino.         FINAL IMPRESSION    1. Acute kidney injury (H)    2. Hyperkalemia    3. Hypotension, unspecified hypotension type    4. Wide-complex tachycardia (H)    5. Respiratory failure (H)              Current Discharge Medication List          I, Wagner Barrios MD personally performed the services described in this documentation, as scribed by Ana Rosa in my presence, and it is both accurate and complete.       Wagner Sharma MD  08/14/18 3006       Wagner Sharma MD  08/14/18 2735

## 2021-06-19 NOTE — PROCEDURES
"Hemodialysis Procedure NoteScot Jaime 86 y.o. male Dialysis treatment started at 01:25 and ended at 04:15; ran for 2 hours and 50 minutes on a K bath of 2.  Total removed 0 kg.  Meds given 3 units 12. 5% albumin, 1 liter of Normal saline per critical care team. Vaso pressers titrated up to support blood pressure during dialysis. Access right non-tunneled catheter. Heparin none.Complications. Patient hypotensive at onset of dialysis, vaso pressers titrated up to support blood pressure during procedure. Patient required a high dose of vaso pressers along with albumin and normal saline boluses to support blood pressure.  Patient taken off 10\" early due to severe hypotension, patient blood pressure improved with stopping dialysis and blood returned.    Incapacitated Dialysis Nurse Procedure reviewed with Sarah Magaña RN  Water alarm functional and in place entire treatment.  Hepatitis B status. Unknown Date 8/15/18.  Patient Consent Verified yes.     Date of Service: 8/15/2018    Patient Name: Scot Jaime  : 3/15/1932  MRN: [id number]  Patient Location: [place] [address]       On/Off Time:       Patient Identification:  Hemodialysis Order Present?: Yes  Patient Identified with 2 Identifiers?: Yes  Time Out?: Yes  Pre-run report Received from (name):: Sarah Magaña RN    Consent Signed:  Consent Signed?: Yes    Machine Assessment Checklist:  RO (portable or central): Portable  Machine Number: [id number]  Dialysate pH: 7.2  Andrew-L Standard: 14  Machine Conductivity: 13.7  Machine Temp (celsius): 36.5 celsius  Alarm Test Pass?: Yes  Time Alarm Test Passed: 0110  Air Detector On?: Yes    Chlorine Testing:  Total Chlorine 1: <0.1 PPM  Time of Chlorine Test 1: 0100    Dialysate:  Sodium Profile: None  Initial Sodium: 138  End Sodium: 138  Duration: 3  Dialysate Temperature (celsius): 97.7  F (36.5  C)  UF Profile: None  Sodium (Na): 138  Potassium (K+): 2  Sodium Bicarbonate: 35  Calcium (Ca++): 2.5    Pre-Run " Information:  Incapacitated Nurse Education: Yes  Location: bedside  Goal Weight (kg): 0 kg  Transport Method: N/A  Dialyzer Lot #: [id number]  Blood Line Lot #: [id number]  Prime: Normal Saline  Anticoaglulant: None  Additional Orders: Medications (see MAR)    Respiratory Assessment- Pre-Run:  Airway LDA: ETT.    Oxygen Therapy:  O2 Device: Ventilator  FiO2 (%): 70 %  SpO2: (!) 87 %    Additional Information:  Diet: NPO  Cardiac Rhythm: Atrial fibrillation  Telemetry: Bedside  Edema: Generalized  Generalized Edema: Moderate pitting, indentation subsides rapidly  RUE Edema: Mild pitting, slight indentation  LUE Edema: Mild pitting, slight indentation  RLE Edema: Moderate pitting, indentation subsides rapidly  LLE Edema: Moderate pitting, indentation subsides rapidly    Machine Settings and Measurements:  Documentation Flowsheet Data as of 8/14/2018       8/15/2018 0125 8/15/2018 0130 8/15/2018 0132 8/15/2018 0134    Blood Flow Rate (mL/min) : 300 mL/min 300 mL/min 300 mL/min 300 mL/min    Arterial Pressure (mmHg) : -100 mmHg -100 mmHg -100 mmHg -100 mmHg    Venous Pressure (mmHg) : 130 110 140 140    Trans Membrane Pressure (mmHg): 60 mmHg 60 mmHg 60 mmHg 60 mmHg    Ultrafiltration - Cumulative Vol. (mL): 0 mL 100 mL 150 mL 160 mL    Transducer Check : Y Y Y Y    Water Alarm Check : Y Y Y Y    Patient Status : Resting/Sleeping Unresponsive Unresponsive Unresponsive              8/15/2018 0136 8/15/2018 0138 8/15/2018 0140 8/15/2018 0145    Blood Flow Rate (mL/min) : 300 mL/min 300 mL/min 300 mL/min 300 mL/min    Arterial Pressure (mmHg) : -100 mmHg -100 mmHg -100 mmHg -100 mmHg    Venous Pressure (mmHg) : 140 140 140 140    Trans Membrane Pressure (mmHg): 60 mmHg 60 mmHg 60 mmHg 60 mmHg    Ultrafiltration - Cumulative Vol. (mL): 170 mL 180 mL 180 mL 180 mL    Transducer Check : Y Y Y Y    Water Alarm Check : Y Y Y Y    Patient Status : Unresponsive Unresponsive Unresponsive Unresponsive              8/15/2018 0150  8/15/2018 0155 8/15/2018 0200 8/15/2018 0205    Blood Flow Rate (mL/min) : 300 mL/min 300 mL/min 300 mL/min 300 mL/min    Arterial Pressure (mmHg) : -100 mmHg -110 mmHg -110 mmHg -110 mmHg    Venous Pressure (mmHg) : 140 150 150 150    Trans Membrane Pressure (mmHg): 60 mmHg 60 mmHg 60 mmHg 60 mmHg    Ultrafiltration - Cumulative Vol. (mL): 180 mL 180 mL 180 mL 180 mL    Transducer Check : Y Y Y Y    Water Alarm Check : Y Y Y Y    Patient Status : Unresponsive Unresponsive Unresponsive Unresponsive              8/15/2018 0210 8/15/2018 0215 8/15/2018 0220 8/15/2018 0225    Blood Flow Rate (mL/min) : 300 mL/min 300 mL/min 300 mL/min 300 mL/min    Arterial Pressure (mmHg) : -100 mmHg -100 mmHg -100 mmHg -100 mmHg    Venous Pressure (mmHg) : 150 150 150 150    Trans Membrane Pressure (mmHg): 60 mmHg 60 mmHg 60 mmHg 60 mmHg    Ultrafiltration - Cumulative Vol. (mL): 190 mL 190 mL 190 mL 200 mL    Transducer Check : Y Y Y Y    Water Alarm Check : Y Y Y Y    Patient Status : Unresponsive Unresponsive Unresponsive Unresponsive              8/15/2018 0226 8/15/2018 0230 8/15/2018 0235 8/15/2018 0242    Blood Flow Rate (mL/min) : 300 mL/min 300 mL/min 300 mL/min 300 mL/min    Arterial Pressure (mmHg) : -100 mmHg -100 mmHg -100 mmHg -100 mmHg    Venous Pressure (mmHg) : 150 150 150 150    Trans Membrane Pressure (mmHg): 60 mmHg 60 mmHg 60 mmHg 60 mmHg    Ultrafiltration - Cumulative Vol. (mL): 210 mL 210 mL 210 mL 240 mL    Transducer Check : Y Y Y Y    Water Alarm Check : Y Y Y Y    Patient Status : Unresponsive Unresponsive Unresponsive Unresponsive              8/15/2018 0250 8/15/2018 0300 8/15/2018 0310 8/15/2018 0315    Blood Flow Rate (mL/min) : 300 mL/min 300 mL/min 300 mL/min 300 mL/min    Arterial Pressure (mmHg) : -100 mmHg -100 mmHg -100 mmHg -100 mmHg    Venous Pressure (mmHg) : 150 150 150 150    Trans Membrane Pressure (mmHg): 60 mmHg 60 mmHg 60 mmHg 60 mmHg    Ultrafiltration - Cumulative Vol. (mL): 250 mL 250  mL 250 mL 270 mL    Transducer Check : Y Y Y Y    Water Alarm Check : Y Y Y Y    Patient Status : Unresponsive Unresponsive Unresponsive Unresponsive              8/15/2018 0320 8/15/2018 0330 8/15/2018 0340 8/15/2018 0350    Blood Flow Rate (mL/min) : 300 mL/min 300 mL/min 300 mL/min 300 mL/min    Arterial Pressure (mmHg) : -100 mmHg -100 mmHg -100 mmHg -100 mmHg    Venous Pressure (mmHg) : 150 150 150 150    Trans Membrane Pressure (mmHg): 60 mmHg 60 mmHg 60 mmHg 60 mmHg    Ultrafiltration - Cumulative Vol. (mL): 300 mL 350 mL 375 mL 400 mL    Transducer Check : Y Y Y Y    Water Alarm Check : Y Y Y Y    Patient Status : Unresponsive Unresponsive Unresponsive Unresponsive              8/15/2018 0355 8/15/2018 0400          Blood Flow Rate (mL/min) : 300 mL/min 300 mL/min      Arterial Pressure (mmHg) : -100 mmHg -100 mmHg      Venous Pressure (mmHg) : 150 150      Trans Membrane Pressure (mmHg): 60 mmHg 60 mmHg      Ultrafiltration - Cumulative Vol. (mL): 450 mL 475 mL      Transducer Check : Y Y      Water Alarm Check : Y Y      Patient Status : Unresponsive Unresponsive             Skin Assessment- Pre-Run:  Skin : Warm.    Hemodialysis Catheter:  Hemodialysis Access Non-Tunneled;Internal Jugular Vein Right Internal Jugular Vein (Active)   Site Skin Assessment Checked;Clean;Dry;Intact 8/15/2018 12:00 AM   Status Accessed 8/15/2018  1:21 AM   Dressing Status  Clean;Dry;Intact 8/15/2018 12:00 AM   Site Condition No complications 8/15/2018  1:21 AM   Dressing Occlusive 8/15/2018  1:21 AM        Medications:       Vital Signs:  Documentation Flowsheet Data as of 8/14/2018 8/14/2018 1940 8/14/2018 1943 8/14/2018 1950 8/14/2018 1951    Pre or Post Procedure?: - - - -    Weight: - - - -    BP: 90/52 90/52 (!)  61/34 (!)  61/34    BP Location: - Left arm - Left arm    BP Method: - Machine/Monitor - Machine/Monitor    Pulse: (!)  0 90 74 74    Resp: 16 10 16 10    Pain Score (Initial OR Reassessment): - - - -     Access Visible and Secure: - - - -              8/14/2018 1955 8/14/2018 1956 8/14/2018 1957 8/14/2018 1958    Pre or Post Procedure?: - - - -    Weight: - - - -    BP: (!)  59/33 - - -    BP Location: Left arm - - -    BP Method: Machine/Monitor - - -    Pulse: 74 77 - -    Resp: 16 17 24 23    Pain Score (Initial OR Reassessment): - - - -    Access Visible and Secure: - - - -              8/14/2018 1959 8/14/2018 2000 8/14/2018 2001 8/14/2018 2002    Pre or Post Procedure?: - - - -    Weight: - - - -    BP: - - - -    BP Location: - - - -    BP Method: - - - -    Pulse: - - - -    Resp: (!)  29 (!)  32 (!)  30 (!)  37    Pain Score (Initial OR Reassessment): - - - -    Access Visible and Secure: - - - -              8/14/2018 2003 8/14/2018 2004 8/14/2018 2005 8/14/2018 2006    Pre or Post Procedure?: - - - -    Weight: - - - -    BP: - (!)  72/34 (!)  68/37 (!)  68/37    BP Location: - - - Right arm    BP Method: - - - Machine/Monitor    Pulse: - 87 - 97    Resp: 25 17 18 17    Pain Score (Initial OR Reassessment): - - - -    Access Visible and Secure: - - - -              8/14/2018 2007 8/14/2018 2008 8/14/2018 2009 8/14/2018 2010    Pre or Post Procedure?: - - - -    Weight: - - - -    BP: - - - (!)  69/41    BP Location: - - - -    BP Method: - - - -    Pulse: - 82 82 80    Resp: 16 21 24 16    Pain Score (Initial OR Reassessment): - - - -    Access Visible and Secure: - - - -              8/14/2018 2011 8/14/2018 2012 8/14/2018 2013 8/14/2018 2014    Pre or Post Procedure?: - - - -    Weight: - - - -    BP: - - - -    BP Location: - - - -    BP Method: - - - -    Pulse: 78 75 76 77    Resp: 16 16 16 16    Pain Score (Initial OR Reassessment): - - - -    Access Visible and Secure: - - - -              8/14/2018 2015 8/14/2018 2016 8/14/2018 2017 8/14/2018 2018    Pre or Post Procedure?: - - - -    Weight: - - - -    BP: (!)  73/44 - - -    BP Location: - - - -    BP Method: - - - -    Pulse: 77 77 77 76     Resp: 16 17 17 16    Pain Score (Initial OR Reassessment): - - - -    Access Visible and Secure: - - - -              8/14/2018 2019 8/14/2018 2020 8/14/2018 2021 8/14/2018 2022    Pre or Post Procedure?: - - - -    Weight: - - - -    BP: (!)  72/43 - (!)  80/45 -    BP Location: - - - -    BP Method: - - - -    Pulse: 75 76 76 75    Resp: 16 18 16 16    Pain Score (Initial OR Reassessment): - - - -    Access Visible and Secure: - - - -              8/14/2018 2023 8/14/2018 2024 8/14/2018 2025 8/14/2018 2026    Pre or Post Procedure?: - - - -    Weight: - - - -    BP: - (!)  77/36 - -    BP Location: - - - -    BP Method: - - - -    Pulse: 75 77 76 76    Resp: 16 19 17 16    Pain Score (Initial OR Reassessment): - - - -    Access Visible and Secure: - - - -              8/14/2018 2027 8/14/2018 2028 8/14/2018 2030 8/14/2018 2031    Pre or Post Procedure?: - - - -    Weight: - - - 178 lb (80.7 kg)    BP: (!)  83/40 - (!)  84/48 -    BP Location: - - - -    BP Method: - - - -    Pulse: 77 75 79 -    Resp: 23 (!)  29 19 -    Pain Score (Initial OR Reassessment): - - - -    Access Visible and Secure: - - - -              8/14/2018 2033 8/14/2018 2036 8/14/2018 2037 8/14/2018 2038    Pre or Post Procedure?: - - - -    Weight: - - - -    BP: 93/44 (!)  89/45 - -    BP Location: - - - -    BP Method: - - - -    Pulse: 76 76 76 77    Resp: 12 17 17 16    Pain Score (Initial OR Reassessment): - - - -    Access Visible and Secure: - - - -              8/14/2018 2039 8/14/2018 2040 8/14/2018 2041 8/14/2018 2042    Pre or Post Procedure?: - - - -    Weight: - - - -    BP: (!)  81/39 - - (!)  89/47    BP Location: - - - -    BP Method: - - - -    Pulse: 76 75 75 77    Resp: 16 16 16 16    Pain Score (Initial OR Reassessment): - - - -    Access Visible and Secure: - - - -              8/14/2018 2043 8/14/2018 2044 8/14/2018 2045 8/14/2018 2046    Pre or Post Procedure?: - - - -    Weight: - - - -    BP: - - 98/46 -    BP  Location: - - - -    BP Method: - - - -    Pulse: 75 76 - -    Resp: 17 28 25 (!)  37    Pain Score (Initial OR Reassessment): - - - -    Access Visible and Secure: - - - -              8/14/2018 2047 8/14/2018 2048 8/14/2018 2049 8/14/2018 2050    Pre or Post Procedure?: - - - -    Weight: - - - -    BP: - 93/46 - -    BP Location: - - - -    BP Method: - - - -    Pulse: - - - -    Resp: 17 22 (!)  37 (!)  40    Pain Score (Initial OR Reassessment): - - - -    Access Visible and Secure: - - - -              8/14/2018 2051 8/14/2018 2054 8/14/2018 2057 8/14/2018 2100    Pre or Post Procedure?: - - - -    Weight: - - - -    BP: 94/46 92/45 101/46 104/50    BP Location: - - - -    BP Method: - - - -    Pulse: - - - -    Resp: (!)  32 (!)  46 (!)  40 (!)  45    Pain Score (Initial OR Reassessment): - - - -    Access Visible and Secure: - - - -              8/14/2018 2103 8/14/2018 2148 8/14/2018 2300 8/14/2018 2305    Pre or Post Procedure?: - - - -    Weight: - - - -    BP: 94/52 - 123/51 132/49    BP Location: - - - -    BP Method: - - - -    Pulse: - 82 81 86    Resp: (!)  33 - 17 17    Pain Score (Initial OR Reassessment): - - - -    Access Visible and Secure: - - - -              8/14/2018 2310 8/14/2018 2315 8/14/2018 2330 8/14/2018 2335    Pre or Post Procedure?: - - - -    Weight: - - - -    BP: 126/51 136/53 113/54 -    BP Location: - - - -    BP Method: - - - -    Pulse: 90 88 88 85    Resp: 17 17 17 16    Pain Score (Initial OR Reassessment): - - - -    Access Visible and Secure: - - - -              8/14/2018 2345 8/15/2018 0000 8/15/2018 0015 8/15/2018 0030    Pre or Post Procedure?: - - - -    Weight: - - - -    BP: 104/53 118/51 118/51 119/44    BP Location: - - - -    BP Method: - - - -    Pulse: 90 84 81 81    Resp: 16 16 16 16    Pain Score (Initial OR Reassessment): - - - -    Access Visible and Secure: - - - -              8/15/2018 0045 8/15/2018 0100 8/15/2018 0115 8/15/2018 0121    Pre or Post  Procedure?: - - - Pre-procedure    Weight: - - - -    BP: 104/41 102/44 100/44 103/40    BP Location: - - - Left leg    BP Method: - - - Machine/Monitor    Pulse: 77 74 72 75    Resp: 16 16 16 20    Pain Score (Initial OR Reassessment): - - - -    Access Visible and Secure: - - - -              8/15/2018 0125 8/15/2018 0130 8/15/2018 0132 8/15/2018 0134    Pre or Post Procedure?: - - - -    Weight: - - - -    BP: (!)  88/40 (!)  83/36 (!)  75/35 (!)  78/42    BP Location: Left leg Left leg Left leg Left leg    BP Method: Machine/Monitor Machine/Monitor Machine/Monitor Machine/Monitor    Pulse: 72 72 72 78    Resp: 20 20 20 20    Pain Score (Initial OR Reassessment): - - - -    Access Visible and Secure: Yes Yes Yes Yes              8/15/2018 0136 8/15/2018 0138 8/15/2018 0140 8/15/2018 0142    Pre or Post Procedure?: - - - -    Weight: - - - -    BP: (!)  83/41 90/42 92/49 97/48    BP Location: Left leg Left leg Left leg -    BP Method: Machine/Monitor Machine/Monitor Machine/Monitor -    Pulse: 76 82 84 77    Resp: 22 20 20 21    Pain Score (Initial OR Reassessment): - - - -    Access Visible and Secure: Yes Yes Yes -              8/15/2018 0144 8/15/2018 0145 8/15/2018 0146 8/15/2018 0148    Pre or Post Procedure?: - - - -    Weight: - - - -    BP: 112/49 - 105/51 113/50    BP Location: - Left leg - -    BP Method: - Machine/Monitor - -    Pulse: 81 - 85 82    Resp: 20 - 20 20    Pain Score (Initial OR Reassessment): - - - -    Access Visible and Secure: - Yes - -              8/15/2018 0150 8/15/2018 0154 8/15/2018 0155 8/15/2018 0156    Pre or Post Procedure?: - - - -    Weight: - - - -    BP: 113/53 108/53 118/54 118/54    BP Location: Left leg - Left leg -    BP Method: Machine/Monitor - Machine/Monitor -    Pulse: 87 86 87 86    Resp: 20 20 20 20    Pain Score (Initial OR Reassessment): - - - -    Access Visible and Secure: Yes - Yes -              8/15/2018 0200 8/15/2018 0205 8/15/2018 0210 8/15/2018 0215     Pre or Post Procedure?: - - - -    Weight: - - - -    BP: - - - -    BP Location: Left leg Left leg Left leg Left leg    BP Method: Machine/Monitor Machine/Monitor Machine/Monitor Machine/Monitor    Pulse: - - - -    Resp: - - - -    Pain Score (Initial OR Reassessment): - - - -    Access Visible and Secure: Yes Yes Yes Yes              8/15/2018 0220 8/15/2018 0222 8/15/2018 0224 8/15/2018 0225    Pre or Post Procedure?: - - - -    Weight: - - - -    BP: 132/61 134/56 134/60 134/60    BP Location: Left leg - - Left leg    BP Method: Machine/Monitor - - Machine/Monitor    Pulse: (!)  101 93 94 94    Resp: 20 20 20 20    Pain Score (Initial OR Reassessment): - - - -    Access Visible and Secure: Yes - - Yes              8/15/2018 0226 8/15/2018 0230 8/15/2018 0235 8/15/2018 0236    Pre or Post Procedure?: - - - -    Weight: - - - -    BP: 146/64 138/63 136/62 129/59    BP Location: Left leg Left leg Left leg -    BP Method: Machine/Monitor Machine/Monitor Machine/Monitor -    Pulse: 92 (!)  101 89 97    Resp: 20 20 20 20    Pain Score (Initial OR Reassessment): - - - -    Access Visible and Secure: Yes Yes Yes -              8/15/2018 0238 8/15/2018 0240 8/15/2018 0242 8/15/2018 0245    Pre or Post Procedure?: - - - -    Weight: - - - -    BP: 128/59 129/62 137/61 127/61    BP Location: - - Left leg -    BP Method: - - Machine/Monitor -    Pulse: 95 (!)  101 98 (!)  102    Resp: 21 22 21 21    Pain Score (Initial OR Reassessment): - - - -    Access Visible and Secure: - - Yes -              8/15/2018 0250 8/15/2018 0252 8/15/2018 0255 8/15/2018 0300    Pre or Post Procedure?: - - - -    Weight: - - - -    BP: 122/58 124/57 118/53 110/53    BP Location: Left leg - - Left leg    BP Method: Machine/Monitor - - Machine/Monitor    Pulse: 98 97 97 99    Resp: 21 22 22 21    Pain Score (Initial OR Reassessment): - - - -    Access Visible and Secure: Yes - - Yes              8/15/2018 0305 8/15/2018 0310 8/15/2018 0315  8/15/2018 0320    Pre or Post Procedure?: - - - -    Weight: - - - -    BP: 109/55 116/57 109/56 109/57    BP Location: - Left leg Left leg Left leg    BP Method: - Machine/Monitor Machine/Monitor Machine/Monitor    Pulse: 97 (!)  101 95 (!)  103    Resp: 21 20 21 22    Pain Score (Initial OR Reassessment): - - - -    Access Visible and Secure: - Yes Yes Yes              8/15/2018 0325 8/15/2018 0330 8/15/2018 0335 8/15/2018 0340    Pre or Post Procedure?: - - - -    Weight: - - - -    BP: 108/53 104/51 97/54 95/53    BP Location: - Left leg - Left leg    BP Method: - Machine/Monitor - Machine/Monitor    Pulse: 97 (!)  102 99 (!)  102    Resp: 21 21 20 21    Pain Score (Initial OR Reassessment): - - - -    Access Visible and Secure: - Yes - Yes              8/15/2018 0345 8/15/2018 0350 8/15/2018 0355 8/15/2018 0400    Pre or Post Procedure?: - - - -    Weight: - - - -    BP: (!)  89/51 102/50 99/46 (!)  75/43    BP Location: - Left leg Left leg Left leg    BP Method: - Machine/Monitor Machine/Monitor Machine/Monitor    Pulse: (!)  104 100 (!)  101 99    Resp: 20 21 21 22    Pain Score (Initial OR Reassessment): - - - -    Access Visible and Secure: - Yes Yes Yes              8/15/2018 0405 8/15/2018 0410 8/15/2018 0413 8/15/2018 0420    Pre or Post Procedure?: - - - Post-procedure    Weight: - - - -    BP: (!)  65/44 100/56 106/53 117/57    BP Location: - - - Left leg    BP Method: - - - Machine/Monitor    Pulse: 90 (!)  103 (!)  106 (!)  110    Resp: 21 21 20 21    Pain Score (Initial OR Reassessment): - - - 0    Access Visible and Secure: - - - -          Post run Information:  Dialyzer Rinse (Post): Streaked  Blood Vol Processed (Liters): 52.5 Liters  Post Run Report Called to (name):: Sarah Magaña RN    Post Assessment  Skin : Warm  Respiratory: Crackles  Cardiac : Irregular  Edema: General  Pain Level: 0    Complications:       Output:       Activity:  Head of Bed Elevated : HOB 30.    MD Notified:        Critical Labs:  Critical Lab Result (Lab Name and Value): Lactic Acid 9.6  What Time Did You Notify The Provider?: 0425     Patient Education:       Physician Name & Pager:         Aime Polo

## 2021-06-19 NOTE — PROGRESS NOTES
"  Clinical Nutrition Therapy Assessment Note      Reason for Assessment:   Scot Jaime is a 86 y.o. male assessed by the registered Dietitian for consult and nutrition risk screen.    Admitted for Hyperkalemia, MINERVA, Acute respiratory failure, acidosis, hypotension, septic shock, Hx: prostae ca with mets, newly diagnosed lung ca    S: Pt is intubated and sedated. Day 1 ICU. Nursing consulted palliative care per Rounds.  Next step is determining care goals with family per MD.    Nutrition History:  Information obtained from chart.  Pt has been at TCU in [place] recovering from a gi bleed.  Patient has the following food allergies or intolerances:NKFA    Current Nutrition Prescription:   Diet: NPO  Supplements and Modulars:   Flush Orders:   Propofol Orders:  IV dextrose or Fluids:  dextrose 10% Last Rate: 50 mL/hr (08/15/18 0615)   fentaNYL 2500 mcg/250 mL in NS (10 mcg/mL) Last Rate: Stopped (08/15/18 0730)   norepinephrine IV infusion in NS Last Rate: Stopped (08/15/18 0745)   norepinephrine IV infusion in NS Last Rate: 22 mcg/min (08/15/18 1035)   pantoprozole (PROTONIX) infusion    sodium bicarbonate IV infusion in D5W Last Rate: 125 mL/hr at 08/15/18 0600   vasopressin Last Rate: 2.4 Units/hr (08/15/18 0844)       Current Nutrition Intake:  Total nutrient intake from all sources does not meet estimated nutritional needs.    Anthropometrics:  Height: 5' 10\" (177.8 cm)  Admission weight:  Weight: 178 lb (80.7 kg)   BMI: 25.8  BMI indication: 25-29.9 overweight  Ideal body weight 172 lb      Physical Findings:  The patient has the following physical signs which could indicate malnutrition:did not assess at this time       GI Status/Output:   The patient's GI symptoms include: abdominal distention    Bowel Sounds hypoactive    Skin/Wound:  Ankur score Ankur Scale Score: 9    Medications:  Medications reviewed.  Ssi, levothyroxine, iv abx, senna    Labs:  Labs reviewed  Mg 2.6, Hgb 7.5, lactic acid " 25.5  Glu, poc 66, 153, 140    Assessed Nutritional Needs:  Assessment weight is 80 kg, with a weight source of current    Estimated Energy Needs: 2000 kcals daily per 25 kcal/kg     Estimated Protein Needs: 80 g daily, 1 g/kg.    Estimated Fluid Needs: 2000 mls daily, 25 mls/kg    Malnutrition: unable to determine at this time    Nutrition Risk Level: high risk    Nutrition dx:  Inability to swallow related to ventilator evidenced by NPO    Goal:  Initiate nutrition in next 2-3 days depending on plan of care    Intervention/Monitoring:  POC, ADAT, Need for nutrition support  See Care Plan for Problems, Goals, and Interventions.

## 2021-06-19 NOTE — PROGRESS NOTES
ED call for admission H&P.  Discussed with ICU team and stated patient is critically ill and will not need to involve at this point.  ICU team will call us if he gets better and transfer out of ICU.

## 2021-06-19 NOTE — DISCHARGE SUMMARY
Death Summary    Date of admission: 18  Date of death: 8/15/18  Time of death: 1400  Cause of death: severe sepsis with septic shock    Diagnoses:  Stage IV non small cell lung cancer  Malignant right pleural effusion  Metastatic prostate cancer  Severe sepsis with septic shock  Aspiration pneumonitis  Acute hypoxemic respiratory failure  Lactic acidosis  Multiple organ dysfunction syndrome  Acute kidney injury  Acute liver failure  Acute encephalopathy  Coagulopathy    Death summary: The patient is an 86 year old male with metastatic prostate cancer, recent peptic ulcer disease with gastric ulcer requiring hospitalization, subsequent diagnosis of stage IV non small cell lung cancer with malignant right pleural effusion requiring recent hospitalization s/p palliative tunneled right pleural catheter and discharged to SNF, admitted with acute hypoxemic respiratory failure, severe sepsis with septic shock, and rapidly progressive multiple organ dysfunction syndrome, dialysis-dependent, on high-dose pressors, with refractory lactic acidosis and hyperkalemia, unresponsive despite lack of sedation.  Based on his grim prognosis and rapid decline, his family elected to transition to comfort care.  The patient  on 8/15/18 at 1400 surrounded by family members.  Exam confirmed lack of corneal, pupillary, and oculocephalic reflexes, with absence of heart and lung sounds during one minute of continuous auscultation.    Brown Marrero MD  Pulmonary and Critical Care Medicine  [facility]  Office [phone]

## 2021-06-19 NOTE — PROGRESS NOTES
"ICU PROGRESS NOTE:    PATIENT SUMMARY:    Scot Jaime is a 86 y.o. with a past medical history significant for prostrate cancer, COPD, DM type 2, chronic atrial fibrillation (recent hospital admission INR was trending upwards - 8/10/2018 - 3.2, 8/9/2018 - 2.4, 8/8/2018 - 1.3, 8/7/2018 - 1.1) who presented to the [facility] with hyPERKALemia and severe metabolic acidosis on 8/14/2018  7:12 PM. Mr. Jaime came via a TCU from a recent admission at [facility] (7/27/2018 to 08/10/2018). Below is the discharge summary from Darren Rolle MD ([facility]):    \"The patient presented to the hospital initially for SOB. Prior to this admission he had a right sided pleural effusion that had been drained at the beginning of July. 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 Pulmonology and oncology were consulted. He was offered palliative chemotherapy but has declined. However, he continues to be interested in palliative radiation therapy but would like to have a second opinion [facility]. Palliative care was also consulted and the patient was briefly DNR, DNI but then changed back to Full code status as he \"wants to make sure his family can say goodbye\" even if he is on a ventilator and sedated. During admission a PleuRx catheter was placed ( 08/03/2018) and the patient has been drained 1L daily. Consultants - Subspecialities: Oncology, Palliative Care and Pulmonary. Admission weight 90.5 kg; discharge weight 88.8 kg\"  He was treated for C. Difficile colitis (started and completed vancomycin during previous hospital admission).     He was intubated upon arrival in ED and sent up to ICU yesterday evening.    Lines/Drains/Tubes:  Dialysis line day #0 , Placed on 08/15/2018  Arterial line day #0, Placed on 08/15/2018  OGT day #0  Jaime catheter placed on 08/14/2018    Overnight events:  Ms. Jaime was started on " fentanyl for vent synchrony. Warfarin was stopped since INR was suprtherapetic. Found to be anuric. Put on vancomycin and Zosyn.    SUBJECTIVE:  The patient is intubated and sedated. Family not available at this time    OBJECTIVE:    Physical Exam  Vent settings for last 24 hours:  Vent Mode: VCV  FiO2 (%):  [50 %-100 %] 70 %  S RR:  [16-22] 22  S VT:  [600 mL] 600 mL  PEEP/CPAP (cm H2O):  [5 cm H2O-10 cm H2O] 10 cm H2O  Minute Ventilation (L/min):  [9.7 L/min-13.4 L/min] 13.4 L/min  PIP:  [33 cm H2O-42 cm H2O] 42 cm H2O  MAP (cm H2O):  [10-19] 19    /64  Pulse 69  Resp 22  Wt 178 lb (80.7 kg)  SpO2 (!) 89%    Intake/Output last 3 shifts:  I/O last 3 completed shifts:  In: 4651.4 [I.V.:4475.2; IV Piggyback:176.2]  Out: 0   Intake/Output this shift:       GEN: Intubated and sedated, NAD  HEENT: PERRL  CVS: RRR, S1 & S2 audible, no added sounds  RESP: CTA BL  ABD: Bowel sounds reduced, pleural drain insitu  EXT: Cold and dusky (bilaterally), no rashes, no edema  NEURO: no gross neuro deficits  PSYCH: Intubated and sedated. Unable to assess.    ICU DAILY CHECKLIST                           Can patient transfer out of MICU? no    FAST HUG:    Feeding: No.  Patient is receiving NPO    Jaime:Yes  Analgesia/Sedation:Yes fentanyl  Thromboembolic prophylaxis: yes; Mode:  SCDs  HOB>30:  Yes  Stress Ulcer Protocol Active: yes; Mode: PPI  Glycemic Control: Any glucose > 180 yes; Mode of Insulin Therapy: Not indicated    INTUBATED:  Can patient have daily waking:  no  Can patient have spontaneous breathing trial:  no    Restraints? no    PHYSICAL THERAPY AND MOBILITY:  Can patient have PT and mobility trial: no  Activity: Bed Rest    ASSESSMENT / PLAN:      1. NEURO: Patient is unresponsive currently due to sedation. However, when he was admitted there was no response from (not on sedatives then). There might be possible encephalopathy due to metabolic acidosis, septic shock, acute kidney injury, and ? metastesis to  the brain.   1. Neuro checks per ICU protocol  2. Pain control  3. Consider CT head without contrast    2. CVS: He was hYPOtensive when he was admitted to ICU. Possibly due to septic shock and dehydration. His BPs have stabilized. He also was hYPERkalemic on admission (K+ = 6.6); repeat at 0400 hrs today was 4.1.    Nephrology was brought on board (see note) to address hyPER-K+    Continue with cardiac monitoring    On IV norepinephrine to keep MAP > 65    3. RESP: Newly diagnosed small cell lung cancer stage IV. Acute respiratory failure possibly due to acute metabolic acidosis. He was intubated in the ED. PlueRx is insitu  1. Connect a draining kit to the PlueRx; monitor output  2. Continue with the current vent. settings  3. HOB > 30 degrees to avoid aspiration pneumonia    4. HEMATOLOGIC: History of A-fib with previous admission of subtherapeutic INR. OGT is still draining bloody like gastric fluid. Hemoglobin trending down from 13 (8/14/2018 at 915 hrs) to 9.5 (8/15/2018 at 0400). INR is trending downward: today 2.68 (from 7.02 - 8/14/2018 at 2228 hrs). He is off warfarin.    Consider GI consult regarding the bloody output from OGT    H/H q6H    5. GI: History of GI bleeding secondary to Gastric Ulcer. Per his wife, a vessel was clipped. Elevated liver enzymes possibly secondary to shock. Bloody output from OGT    Continue with IV PPI drip    Consider GI consult regarding the bloody output from OGT    6. RENAL/: History of prostate cancer with bone mets- patient takes Enalutamide daily. He continues to be anuric with history of being hypER-K+. Labs demonstrate high anion-gap metabolic acidosis    Start dialysis today    Replenish electrolytes PRN    Monitor I/Os and weight    Avoid/limit nephrotoxic agents    7. INFECTIOUS DISEASE: Signs and symptoms of septic shock with source unknown currently. Blood cultures and prending. Lactic acid is trending high = 25.5 (as of 1129 hrs; compared to 9.6 despite the  above measures)    Continue with IV Piperacillin-tazobactam and IV vancomycin    Consider the use of steroids to possibly deal with adrenal insufficiency    8. ENDOCRINE: Blood sugars regularly as per ICU protocol    9.  FAMILY COMMUNICATION: Awaiting to meet the family later this afternoon to discuss: (1) goals of care and (2) advance directives    Outside reports reviewed: Historical medical records and Lab reports (Refer to Care Everywhere)    Precepted patient with Dr. Brown Marrero    Critical Care Time greater than: 45 Minutes  Total time spent with patient greater than: 45 Minutes    Geovanna Reddy MD, MPH (PGY 1)  [facility] Family Medicine Resident  Pager: [phone]

## 2021-06-19 NOTE — PROCEDURES
INTERVENTIONAL RADIOLOGY PROCEDURE NOTE    Patient Name: Scot Jaime  Medical Record Number: [id number]  YOB: 1932    Date/Time: 8/14/2018 11:17 PM    Procedure: Non tunneled dialysis catheter placement     Diagnosis: MINERVA. Hyperkalemia. Acidosis    Medications: none    Contrast: None    Fluoroscopy Time: 0.1 minutes    EBL: None    Complications: none immediate    Specimens Sent: None    Findings: R IJ 14F 20 cm non tunneled dialysis catheter placed with tip in the RA. PICC tip at the lower SVC region    Plan: May use catheter now    Maxi Warner MD

## 2021-06-19 NOTE — H&P
PULMONARY / CRITICAL CARE ADMISSION NOTE:      Reason for Consultation :    Scot Jaime,  3/15/1932, MRN [id number]  Date / Time of Admission:  2018  7:12 PM    Admitting Dx: Respiratory failure (H) [J96.90]  Hyperkalemia [E87.5]  Wide-complex tachycardia (H) [I47.2]  Acute kidney injury (H) [N17.9]  Hypotension, unspecified hypotension type [I95.9]    PCP: Aidan Lazar MD, [phone]  Consulting physician:  No att. providers found   Code status:  No Order       Extended Emergency Contact Information  Primary Emergency Contact: Chana Jaime   [facility]  Home Phone: [phone]  Relation: Spouse       ID:  Scot Jaime is a 86 y.o. male with medical history significant for prostate cancer, gastric ulcer with GI bleeding, newly diagnosed lung cancer, atrial fibrillation, CAD with history of stenting and cigarette smoking.  He was recovering at a TCU following a stay at [facility] for GI bleed, and had become weaker over the last few days.  He also needed a draining of his pleura-vac, which was done at the [facility] last evening, where patient usually doctors.  While his family was visiting him today, they noticed that he became diaphoretic, had chest discomfort, and altered mental status.  He was found to be hyperkalemic, with a severe anion gap metabolic acidosis.  He was intubated in ED and will be cared for in the ICU.     Assessment/Plan:   1.  Hyperkalemia:    Likely secondary to minerva/septic shock? (possible that patient aspirated as per family observation, small infiltrate in RLL)    Renal involved, appreciate all of their assistance tonight-HD has been ordered    Non-tunneled catheter will be placed in IR, appreciate    Was given hyperkalemia protocol in ED, K from 7.3 to 6.6    2.  MINERVA:    Likely a result of septic shock of unknown source, also likely an element of dehydration (family states patient has not been eating or drinking for the past few  days)    Renal involved as above    HD as above tonight    3.  Anion-Gap Metabolic Acidosis:    Secondary to MINERVA    Patient started on Bicarb gtt at 125 ml/hr until dialysis can be completed    ABG follow up at midnight and at 4 am    4.  Septic Shock:    Source unknown, but likely lung/aspiration    Blood cultures pending    Will send UA as well    Zosyn and Vancomycin started in ED, will continue    Levophed started in ED for MAP goal greater than 65    Given 3 liters of fluid in ED as well    Will continue IVF with bicarb gtt    Lactic acidosis-quite severe, will trend lactates Q 6 hours      5.  Newly Diagnosed Lung Cancer:    Was told by family that this was this past Wednesday (diagnosis)    [facility], family was planning on working with them again, but patient's MD has been out of town    I will have our oncology group see patient here    He has a pleura-vac drain in place for chronic pleural effusion drainage.  Apparently this is to be done everyday, but his TCU did not have the proper equipment to do so.  [facility] in [facility], amt unknown    6.  Acute Respiratory Failure:    Secondary to acute metabolic acidosis    Intubated in emergency room    Repeat ABG at midnight    Will likely have to hyperventilate more due to severe acidosis      7.  History of GI Bleeding Secondary to Gastric Ulcer:    Was hospitalized for this in [facility] from July 11-August 10 th 2018    Per his wife, a vessel was clipped    IV protonix has been ordered BID      8.  History of Prostate Cancer:    History of bone mets as well, patient takes Enzalutamide daily    Family will be obtaining above oral chemo drug from his TCU and patient will use home supply      9.  Elevated Liver Enzymes:    Likely an acute injury secondary to shock    On exam, liver is quite enlarged    INR also elevated, but patient is anticoagulated with coumadin as  well    10.  History of A Fib:    On coumadin, holding for now, as he is supratherapuetic    Also takes Diltiazem, I am holding this due to his hypotension        Plan   Systems to Assess:     Pulmonary: Wean supplemental O2 as tolerated; goal O2 sat > 92%.  HOB > 30 degrees to limit aspiration risk.      Plan of care for acute respiratory failure and possible aspiration pneumonitis as outlined above    Cardiovascular: Cardiac monitoring.     Hypotension secondary to shock and dehydration    Hyperkalemia, will be getting dialysis tonight, now in a fib, which is chronic for patient, but rate controlled    Levophed ordered to keep MAP greater than 65    Neurological: Neuro checks per ICU protocol.     No response at this point, and patient is not on sedation, we will do a head CT after his is more stable, and after HD    Pain control: Fentanyl is ordered prn if patient does wake more and become uncomfortable    GI/: Yeni, day 0    GI prophylaxis:  PPI BID    Renal: Monitor I/O's.  Electrolyte repletion PRN.  Avoid/limit nephrotoxic agents.     IVFs: Bicarb gtt at 125 ml/hr, will likely be able to d/c after dialysis run    Heme/Coag: Monitor H/H. Daily    DVT prophylaxis:  SCDs, will readdress coumadin dosing when more stable, and not supratherapuetic    Infectious disease: Enteric Precautions, history of C Diff    Endocrine: Blood sugars Q 1 hour for now, will start gtt if needed    Musculoskeletal: Bedrest    Lines: PIC line, Day# 0    Non-Tunneled Dialysis Catheter Day 0  PIC day 0    Activity: Bed Rest    Code Status:  Full    This patient is considered critically ill and requires ICU level of care due to impairment in organ systems and high risk for life threatening deterioration.     TOTAL CRITICAL CARE TIME:  70 minutes.    CRISTHIAN Thompson, CNP  Pulmonary & Critical Care Medicine  8/14/2018  11:05 PM    ICU DAILY CHECKLIST   Can patient transfer out of MICU? no    FAST HUG:    Feeding:  Feeding: Not  Indicated.  Patient is receiving NPO    Jaime:Yes  Analgesia/Sedation:Yes fentanyl  Thromboembolic prophylaxis: yes; Mode:  SCDs  HOB>30:  Yes  Stress Ulcer Protocol Active: yes; Mode: PPI  Glycemic Control: Any glucose > 180 no; Mode of Insulin Therapy: Not indicated    INTUBATED:  Can patient have daily waking: no  Can patient have spontaneous breathing trial: no     Restraints? no    PHYSICAL THERAPY AND MOBILITY:  Can patient have PT and mobility trial: no  Activity: Bed Rest      Chief Complaint Hyperkalemia       ROBERTO Jaime is a 86 y.o. old male with medical history significant for prostate cancer, gastric ulcer with GI bleeding, newly diagnosed lung cancer, atrial fibrillation, CAD with history of stenting and cigarette smoking.  He was recovering at a TCU following a stay at [facility] for GI bleed, and had become weaker over the last few days.  He also needed a draining of his pleura-vac, which was done at the [facility] last evening, where patient usually doctors.  While his family was visiting him today, they noticed that he became diaphoretic, had chest discomfort, and altered mental status.  He was found to be hyperkalemic, with a severe anion gap metabolic acidosis.  He was intubated in ED and will be cared for in the ICU.   Per family, patient was at a [facility] for a gastric ulcer bleed from mid July to the beginning of August.  He is currently in a TCU, and has been declining over the last few days.  Per his family, he is not eating and drinking, I am not sure if a feeding tube was addressed.  Patient was also recently diagnosed with lung cancer, but has not started treatment at this point.  Family was told that it would likely be radiation therapy.  When he was feeling better, they were going to discuss this option further.  Tonight, his family was visiting him at the TCU and noted that patient was having more difficulty breathing, was becoming diaphoretic, and was  having chest discomfort.  Patient apparently became unresponsive in route to the ER (via EMS).  He was intubated on arrival to ED and pressors were started.  At this point, family wishes to have all measures done.  They understand that dialysis is an invasive procedure, and that patient may deteriorate during the procedure.  Family does still wish to proceed at this point, and also feel that this would be the patient's wishes if he could speak for himself.     History is provided by Interview with patient's family  Unable to obtain [facility] records or [facility]       Review of Systems   Pertinent items are noted in HPI.     Active Problem List   Patient Active Problem List    Diagnosis Date Noted     Hyperkalemia 08/14/2018        Medical/Surgical History   No past medical history on file.  No past surgical history on file.     Allergies   Allergies   Allergen Reactions     Etodolac-Capsaicin-Me-Sal-Men      Lisinopril      Neomycin      Polymyxin B      Simvastatin         Medications:  OUTpatient medications   Prior to Admission medications    Medication Sig Start Date End Date Taking? Authorizing Provider   acetaminophen (TYLENOL) 325 MG tablet Take 650 mg by mouth every 4 (four) hours as needed for pain.   Yes PROVIDER, HISTORICAL   albuterol (PROVENTIL) 2.5 mg /3 mL (0.083 %) nebulizer solution Take 2.5 mg by nebulization every 4 (four) hours as needed for wheezing.   Yes PROVIDER, HISTORICAL   budesonide-formoterol (SYMBICORT) 160-4.5 mcg/actuation inhaler Inhale 2 puffs 2 (two) times a day.   Yes PROVIDER, HISTORICAL   calcium carbonate-vitamin D3 (CALCIUM WITH VITAMIN D) 600 mg(1,500mg) -400 unit per tablet Take 1 tablet by mouth daily.   Yes PROVIDER, HISTORICAL   citalopram (CELEXA) 10 MG tablet Take 10 mg by mouth every morning.   Yes PROVIDER, HISTORICAL   dextromethorphan-guaiFENesin (ROBITUSSIN-DM)  mg/5 mL liquid Take 10 mL by mouth every 4 (four) hours as needed.   Yes  PROVIDER, HISTORICAL   diltiazem (CARDIZEM CD) 120 MG 24 hr capsule Take 120 mg by mouth daily.   Yes PROVIDER, HISTORICAL   docusate sodium (COLACE) 100 MG capsule Take 100 mg by mouth 2 (two) times a day as needed for constipation.   Yes PROVIDER, HISTORICAL   enzalutamide 40 mg cap Take 160 mg by mouth every evening.   Yes PROVIDER, HISTORICAL   HYDROcodone-acetaminophen 5-325 mg per tablet Take 1 tablet by mouth every 4 (four) hours as needed for pain.   Yes PROVIDER, HISTORICAL   isosorbide mononitrate (IMDUR) 60 MG 24 hr tablet Take 60 mg by mouth daily.   Yes PROVIDER, HISTORICAL   levothyroxine (SYNTHROID, LEVOTHROID) 125 MCG tablet Take 125 mcg by mouth Daily at 6:00 am.   Yes PROVIDER, HISTORICAL   menthol 4 % Gel Apply 1 application topically 2 (two) times a day as needed.   Yes PROVIDER, HISTORICAL   multivitamin therapeutic tablet Take 1 tablet by mouth daily.   Yes PROVIDER, HISTORICAL   pantoprazole (PROTONIX) 40 MG tablet Take 40 mg by mouth 2 (two) times a day.   Yes PROVIDER, HISTORICAL   pravastatin (PRAVACHOL) 10 MG tablet Take 10 mg by mouth at bedtime.   Yes PROVIDER, HISTORICAL   senna (SENOKOT) 8.6 mg tablet Take 2 tablets by mouth 2 (two) times a day.   Yes PROVIDER, HISTORICAL   tacrolimus (PROTOPIC) 0.1 % ointment Apply 1 application topically 2 (two) times a day as needed.   Yes PROVIDER, HISTORICAL   warfarin (COUMADIN) 1 MG tablet Take 1 mg by mouth daily. 1 mg daily from 8/13 to 8/19 then check INR   Yes PROVIDER, HISTORICAL          Medications:  INpatient medications     dextrose 50 % (D50W)  50 mL Intravenous Once     [START ON 8/15/2018] dextrose 50 % (D50W)  50 mL Intravenous Once     [START ON 8/15/2018] enzalutamide  160 mg Oral QPM     [START ON 8/15/2018] levothyroxine  75 mcg Intravenous Daily 0600     pantoprazole  40 mg Intravenous BID     piperacillin-tazobactam  3.375 g Intravenous Once     [START ON 8/15/2018] piperacillin-tazobactam  3.375 g Intravenous Q8H     [START  ON 8/15/2018] senna-docusate  1 tablet Oral BID    Or     [START ON 8/15/2018] senna (SENOKOT) syrup  8.8 mg Enteral Tube BID     [START ON 8/15/2018] sodium chloride  3 mL Intravenous Line Care     vancomycin  20 mg/kg Intravenous Once     vancomycin intermittent dosing   Other Med Consult or Protocol          Family History Social History     No family history on file.    Social History     Social History     Marital status:      Spouse name: N/A     Number of children: N/A     Years of education: N/A     Occupational History     Not on file.     Social History Main Topics     Smoking status: Not on file     Smokeless tobacco: Not on file     Alcohol use Not on file     Drug use: Not on file     Sexual activity: Not on file     Other Topics Concern     Not on file     Social History Narrative      Psychosocial Needs  Social History     Social History Narrative     Additional psychosocial needs reviewed per nursing assessment.     PHYSICAL EXAM   VITALS:  BP 94/52  Pulse 82  Resp (!) 33  Wt 178 lb (80.7 kg)  SpO2 100%  Heart Rate:  [0-101] 82  Resp:  [10-46] 33  BP: ()/(33-56) 94/52  SpO2:  [78 %-100 %] 100 %  ETCO2 (mmHg):  [18 mmHg-29 mmHg] 18 mmHg  FiO2 (%):  [50 %-100 %] 50 %    PHYSICAL EXAM:   General appearance: appears older than stated age, pale, unresponsive  Neck: no JVD and supple, symmetrical, trachea midline  Lungs: clear to auscultation bilaterally  Heart: Irregular, rate controlled, S1 and S2.  No murmurs  Abdomen: Soft, appears to be non-tender.  Enlarged liver  Extremities: extremities normal, atraumatic, no cyanosis or edema  Pulses: 1+ BUE, BLE  Skin: Skin color, texture, turgor normal. No rashes or lesions  Neurologic:  No response to stimuli.     I&O:    Intake/Output Summary (Last 24 hours) at 08/14/18 2305  Last data filed at 08/14/18 2053   Gross per 24 hour   Intake             3000 ml   Output                0 ml   Net             3000 ml       Pertinent Labs   Lab  Results: personally reviewed.     Serum Glucose range:No results for input(s): POCGLU in the last 72 hours.  ABG:  ABGs: No results found for: PH  CBC:    Results from last 7 days  Lab Units 08/14/18 1915   LN-WHITE BLOOD CELL COUNT thou/uL 13.5*   LN-HEMOGLOBIN g/dL 13.0*   LN-HEMATOCRIT % 41.0   LN-PLATELET COUNT thou/uL 260     Chemistry:    Results from last 7 days  Lab Units 08/14/18  2221 08/14/18 1915   LN-SODIUM mmol/L 126* 123*   LN-POTASSIUM mmol/L 6.6* 7.3*   LN-CHLORIDE mmol/L 99 89*   LN-CO2 mmol/L 7* 8*   LN-BLOOD UREA NITROGEN mg/dL 68* 75*   LN-CREATININE mg/dL 2.76* 3.14*   LN-CALCIUM mg/dL 8.2* 9.7   LN-MAGNESIUM mg/dL  --  3.5*   LN-ALBUMIN g/dL 2.0*  --    LN-ALT (SGPT) U/L 1654*  --    LN-AST (SGOT) U/L 3830*  --    LN-ALKALINE PHOSPHATASE U/L 219*  --    LN-BILIRUBIN TOTAL mg/dL 1.6*  --      Coags:  Lab Results   Component Value Date    INR 7.02 (HH) 08/14/2018    INR 4.46 (H) 08/14/2018    INR 2.31 (H) 08/13/2018     Cardiac Markers:    Results from last 7 days  Lab Units 08/14/18 1915   LN-TROPONIN I ng/mL 0.03       Microbiology:    Blood Cultures Pending    Urine to be sent     Pertinent Radiology   Radiology Results: Personally reviewed image/s    Chest X-Ray:   XR CHEST 1 VIEW PORTABLE  8/14/2018 6:52 PM     INDICATION: S/p intubation  COMPARISON: None.     FINDINGS: Cardiomegaly. Mild central vascular congestion. Small right pleural effusion and right basilar atelectasis. Endotracheal tube tip in satisfactory position at the level of the clavicles. Right-sided chest tube extends to the apex. No   Pneumothorax.      XR CHEST 1 VIEW FOR PICC LINE PLACEMENT PORTABLE  8/14/2018 10:12 PM     INDICATION: Verify catheter placement  COMPARISON: None.     FINDINGS: Right PICC with tip at the cavoatrial junction in good location. Mild worsening interstitial infiltrate right lung with small right pleural effusion. Stable right chest tube and endotracheal tube.    [facility]  8/14/2018  11:19 PM     PROCEDURE: NONTUNNELED CENTRAL VENOUS CATHETER PLACEMENT     INTERVENTIONAL RADIOLOGIST: Maxi Warner MD.     INDICATION: Acute kidney injury. Hyperkalemia. Acidosis.     MODERATE SEDATION: None.     FLUOROSCOPIC TIME: 0.1 minutes.     RADIATION DOSE: Air Kerma: 5 mGy     CONTRAST: None.     COMPLICATIONS: No immediate complications.     STERILE BARRIER TECHNIQUE: Maximum sterile barrier technique was used. Cutaneous antisepsis was performed at the operative site with application of 2% chlorhexidine and a full body sterile drape. Prior to the procedure, the  and assistant   performed hand hygiene and wore hat, mask, sterile gown, and sterile gloves during the entire procedure. Ultrasound was prepped with a sterile probe cover and sterile gel was used.      PROCEDURE:    After informed consent was obtained, the right  aspect of the neck was prepped and draped in sterile fashion. Using local anesthesia, real-time ultrasound guidance, and a micropuncture set, access was obtained into the right internal jugular vein.   Through the micropuncture sheath, an 0.035 Amplatz superstiff guidewire was advanced to the superior vena cava with fluoroscopic monitoring. The tract was dilated and a 14 Syriac, 20 cm Schon nontunneled dialysis catheter advanced until the tip was in   the right atrium. Both lumens aspirated and flushed adequately.     The central venous catheter insertion checklist was reviewed prior to placement and followed throughout the procedure.     FINDINGS:  ULTRASOUND: Patent and compressible right internal jugular vein. Images recorded without and with compression.     Final fluoroscopic image demonstrates a right internal jugular nontunneled central venous catheter with tip in the right atrium. Indwelling right upper extremity PICC with its tip at the lower SVC region.     IMPRESSION:   1.  Successful nontunneled central venous catheter placement.       EKG Results: personally reviewed.         Outside reports reviewed: ER records and Lab reports

## 2021-06-19 NOTE — PROGRESS NOTES
MD RESTRAINT FOR NON-VIOLENT BEHAVIOR FACE TO FACE EVALUATION    Patient's Immediate Situation:  Patient demonstrated the following behaviors: Pulling/tugging at invasive lines or tubes and does not respond to verbal/non-verbal redirection    Patient's Reaction to the intervention:  Does patient understand the reason for restraint/seclusion? Unable to Express    Medical Condition:  Is there any evidence of compromise of Skin integrity, Respiratory, Cardiovascular, Musculoskeletal, Hydration? No    Behavioral Condition:  In consultation with the RN, is there a need to continue this restraint or seclusion? Yes    See Restraint Flowsheet for complete restraint documentation and assessment.    Lizbeth Arciniega, CNP

## 2021-06-19 NOTE — PROGRESS NOTES
Pharmacy Note - Admission Medication History    Pertinent Provider Information: none     ______________________________________________________________________    Prior To Admission (PTA) med list completed and updated in EMR.       PTA Med List   Medication Sig Note Last Dose     acetaminophen (TYLENOL) 325 MG tablet Take 650 mg by mouth every 4 (four) hours as needed for pain.  Past Week at 8/11 x1     albuterol (PROVENTIL) 2.5 mg /3 mL (0.083 %) nebulizer solution Take 2.5 mg by nebulization every 4 (four) hours as needed for wheezing.  prn     budesonide-formoterol (SYMBICORT) 160-4.5 mcg/actuation inhaler Inhale 2 puffs 2 (two) times a day.  8/14/2018 at x1     calcium carbonate-vitamin D3 (CALCIUM WITH VITAMIN D) 600 mg(1,500mg) -400 unit per tablet Take 1 tablet by mouth daily.  8/14/2018 at am     citalopram (CELEXA) 10 MG tablet Take 10 mg by mouth every morning.  8/14/2018 at am     dextromethorphan-guaiFENesin (ROBITUSSIN-DM)  mg/5 mL liquid Take 10 mL by mouth every 4 (four) hours as needed.  Past Week at 8/11     diltiazem (CARDIZEM CD) 120 MG 24 hr capsule Take 120 mg by mouth daily.  8/14/2018 at am     docusate sodium (COLACE) 100 MG capsule Take 100 mg by mouth 2 (two) times a day as needed for constipation.  prn     enzalutamide 40 mg cap Take 160 mg by mouth every evening.  8/13/2018 at Unknown time     HYDROcodone-acetaminophen 5-325 mg per tablet Take 1 tablet by mouth every 4 (four) hours as needed for pain. 8/14/2018: 2-4 tablets daily 8/14/2018 at 1303     isosorbide mononitrate (IMDUR) 60 MG 24 hr tablet Take 60 mg by mouth daily.  8/13/2018 at (dose held this morning)     levothyroxine (SYNTHROID, LEVOTHROID) 125 MCG tablet Take 125 mcg by mouth Daily at 6:00 am.  8/14/2018 at am     menthol 4 % Gel Apply 1 application topically 2 (two) times a day as needed.  prn     multivitamin therapeutic tablet Take 1 tablet by mouth daily.  8/14/2018 at Unknown time     pantoprazole (PROTONIX)  40 MG tablet Take 40 mg by mouth 2 (two) times a day.  8/14/2018 at x1     pravastatin (PRAVACHOL) 10 MG tablet Take 10 mg by mouth at bedtime.  8/13/2018 at pm     senna (SENOKOT) 8.6 mg tablet Take 2 tablets by mouth 2 (two) times a day.  8/14/2018 at x1     tacrolimus (PROTOPIC) 0.1 % ointment Apply 1 application topically 2 (two) times a day as needed.  prn     warfarin (COUMADIN) 1 MG tablet Take 1 mg by mouth daily. 1 mg daily from 8/13 to 8/19 then check INR  8/13/2018 at pm       Information source(s): Facility ([facility]/) medication list/MAR    Summary of Changes to PTA Med List  New: all  Discontinued: none  Changed: none    Patient was asked about OTC/herbal products specifically.  PTA med list reflects this.    Based on the pharmacist s assessment, the PTA med list information appears reliable    Patient appears adherent: Yes    Allergies were reviewed, assessed, and updated with the patient.      Patient did not bring any medications to the hospital and can't retrieve from home. No multi-dose medications are available for use during hospital stay.     Thank you for the opportunity to participate in the care of this patient.    Ericka Louie, PharmD  8/14/2018 9:43 PM

## 2021-06-19 NOTE — PROGRESS NOTES
Pharmacy Consult: Vancomycin Dosing    Pharmacist consulted to dose vancomycin for Scot Jaime, a 86 y.o. male.    Ordering provider: Lizbeth Arciniega CNP    Indication for vancomycin therapy: Sepsis    Goal Trough Range:  15-20 mcg/mL based on indication    Other current antimicrobials             piperacillin-tazobactam 3.375 g in NaCl 0.9 % 50 mL (MINI-BAG Plus) (ZOSYN)  Every 8 hours          piperacillin-tazobactam 3.375 g in NaCl 0.9 % 50 mL (MINI-BAG Plus) (ZOSYN)  Once          vancomycin 1,500 mg in sodium chloride 0.9% 500 mL (VANCOCIN)  Once             Subjective/Objective:    Patient was admitted for Hyperkalemia on 8/14/2018    Height:      Actual Body Weight (ABW): 80.7 kg (178 lb)    Patient height not recorded    BMI: There is no height or weight on file to calculate BMI.    Allergies   Allergen Reactions     Etodolac-Capsaicin-Me-Sal-Men      Lisinopril      Neomycin      Polymyxin B      Simvastatin        Patient Active Problem List   Diagnosis     Hyperkalemia    No past medical history on file.     There were no vitals filed for this visit.  Net Intake/Output (last 24 hours):       Recent Labs      08/14/18   1915   WBC  13.5*   LACTICACID  14.3*   BUN  75*   CREATININE  3.14*     CrCl cannot be calculated (Unknown ideal weight.). Manually calculated crcl ~ 19 mL/min      Assessment/Plan:    Pharmacist consulted to dose vancomycin for Sepsis, goal trough range 15-20 mcg/mL.  1. Initiate vancomycin intermittent dosing.  2. No vancomycin level available for assessment.  3. Pharmacist will plan to check a vancomycin trough level when clinically appropriate.  4. Pharmacist will continue to follow.    Thank you for the consult.  Bia Francis, PharmD 8/14/2018 9:31 PM

## 2021-06-19 NOTE — PROGRESS NOTES
Critical Care Note    I discussed the patient with Dr. Reddy, reviewed the relevant imaging studies and labs, discussed the case with the patient's nurse, and met with and examined the patient.  I agree with Dr. Reddy's documentation.    Critical care time: 90 minutes exclusive of procedures    Brown Marrero MD  Pulmonary and Critical Care Medicine  [facility]  Cell [phone]  Office [phone]  Pager [phone]

## 2021-06-19 NOTE — CONSULTS
[facility] PALLIATIVE CARE CONSULTATION    PATIENT NAME: Scot Jaime  MRN #: [id number]  DATE OF ADMISSION: 2018   DATE OF ENCOUNTER: 8/15/2018   REQUESTING PHYSICIAN: Dr. Marrero  PRIMARY CARE PROVIDER: Aidan Lazar MD  CONSULTANT: Anca Hardy, CNS  VISIT #: 1    REASON FOR CONSULTATION: Goals of Care     IMPRESSION:  1.  Respiratory failure, secondary to acute metabolic acidosis  2.  Somnolence, secondary to critical illness  3. Palliative Care Encounter:     RECOMMENDATIONS:  -See goals of care discussion below  -Change CODE STATUS to DNR  -Once other family members arrive and family is ready, transition to comfort care, including discontinuing any medications and treatments that are not specifically for comfort.  -Recommend pre-medicating with 50 mcg of fentanyl IV and 1 mg IV Dilaudid 10 minutes prior to extubation.  -Comfort care order set initiated.  -Recommend morphine 10 mg sublingual every 4 hours with additional 10-15 mg sublingual every hour as needed for pain or shortness of breath  -In addition Ativan 0.5-1 mg IV/sublingual every 4 hours as needed for anxiety, shortness of breath, nausea  -Scopolamine patch 1.5 g transdermal every 72 hours  -Spiritual care consult  -Patient likely will die the next 24-48 hours, therefore likely will remain in hospital until end-of-life.  -Palliative care will continue to follow.  Please call if further concerns, thank you for involving our service in Mr. Jaime care.    Addendum 1500: Returned to unit to discuss care plan with family and patient had  shortly before.  Offered emotional support.    These recommendations were discussed with Dr. Marrero.    The patient is not eligible for discharge from a palliative standpoint at this time.    ADMITTING DIAGNOSIS: Hyperkalemia    CHIEF COMPLAINT: NA-patient is not verbally responsive    HISTORY OF PRESENT ILLNESS:  Summary: Scot is a 86 year old male with a past medical history of  prostate cancer, gastric ulcer with GI bleeding, newly diagnosed lung cancer, A. fib, CAD with a history of stenting.  He was recovering at a TCU following a stay at [facility] for GI bleed and became increasingly weak over the past few days.  He had a draining of his Pleur-evac at the [facility] last evening where the patient usually receives his medical care.  Family noted yesterday that he became diaphoretic, having chest discomfort, and altered mental status.  He was brought to the emergency room and was found to be hyperkalemic with a severe anion gap metabolic acidosis.  He was intubated and admitted to the ICU with hyperkalemia, AK I, anion gap metabolic acidosis, septic shock, newly diagnosed lung cancer, acute respiratory failure, history of GI bleeding secondary to gastric ulcer, history of prostate cancer, elevated liver enzymes, history of A. fib.  Other specialties following this admission: Nephrology and critical care.  He had a non-tunneled hemodialysis catheter placed and received dialysis early this morning.  Palliative care was consulted to discuss goals and wishes for care and to make recommendations for symptom management.      Current Symptoms: Unable to complete symptom assessment as patient is not responsive, intubated in ICU.    Recent Changes: Per chart review and notes from [facility].  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  Pulmonary and oncology followed.  He was offered to palliative chemotherapy but declined it.  He [facility].  He also had palliative care consult and was briefly DNR/DNI but then changed back to full code stating he wanted to be able to say goodbye to his family.  He had a Pleurx catheter placed during his  admission and [facility] with [facility].  Social:  to wife Chana for 40 years.  Multiple children, stepchildren, grandchildren.    Patient s Understanding: NA-patient not responsive  Capacity:   The patient does not have decision-making capacity, and does not demonstrate the understanding of relevant information about the condition, the available test and treatment options, use of reason to make a decision concerning these issues, and communicate choice. (LUZ MARINA 306, 4:420-4).    PATIENT GOALS OF CARE: Met with wife, multiple other children and grandchildren and Dr. Marrero.  I introduced the role and philosophy of palliative care.    Dr. Marrero reviewed patient's medical conditions and family agrees it would be in his best interest to transition to comfort care.  Discussed cardiac resuscitation and patient's wife states she does not believe this is what he would want and agreed to change CODE STATUS to DNR.  Plan is to wait for family members to arrive from out of town, probably within the next 1-2 hours.  Then family would like to incision to comfort care.   Medications for comfort and the concept of comfort care in detail.  Offered spiritual care in the would like to have this for family support.    ADVANCED CARE PLANS/HEALTHCARE DIRECTIVES:  No healthcare directive on file    HOSPICE ELIGIBILITY: Not eligible for inpatient hospice    PAST MEDICAL HISTORY: No past medical history on file.    PAST SURGICAL HISTORY:   Past Surgical History:   Procedure Laterality Date     [facility]  8/14/2018            ALLERGIES:   Allergies   Allergen Reactions     Etodolac-Capsaicin-Me-Sal-Men      Lisinopril      Neomycin      Polymyxin B      Simvastatin        MEDICATIONS:   Current Facility-Administered Medications   Medication Dose Route Frequency Provider Last Rate Last Dose     albuterol nebulizer solution 2.5 mg (PROVENTIL)  2.5 mg Nebulization Q4H PRN Lizbeth  Suze, CNP         bisacodyl suppository 10 mg (DULCOLAX)  10 mg Rectal Daily PRN Lizbeth Arciniega CNP         chlorhexidine 0.12 % solution 15 mL (PERIDEX)  15 mL Topical Q12H Lizbeth Arciniega, RL   15 mL at 08/15/18 0007     dextrose 10%  50 mL/hr Intravenous Continuous Lizbeth Arciniega, CNP 50 mL/hr at 08/15/18 0615 50 mL/hr at 08/15/18 0615     dextrose 50 % (D50W) syringe 12.5-25 mL  12.5-25 mL Intravenous PRN Lizbeth Arciniega, CNP         dextrose 50 % (D50W) syringe 20-50 mL  20-50 mL Intravenous PRN Lizbeth Arciniega CNP   50 mL at 08/15/18 0932     dextrose 50 % (D50W) syringe 50 mL  50 mL Intravenous Once Wagner Sharma MD   Stopped at 08/14/18 2025     fentaNYL 2500 mcg/250 mL in NS (10 mcg/mL)   mcg/hr Intravenous Continuous Lizbeth Arciniega CNP   Stopped at 08/15/18 0730     fentaNYL pf injection 25-50 mcg (SUBLIMAZE)  25-50 mcg Intravenous Q1H PRN Lizbeth Arciniega CNP   25 mcg at 08/15/18 0256     glucagon (human recombinant) injection 1 mg  1 mg Subcutaneous PRN Lizbeth Arciniega CNP         heparin injection   Intravenous Code/Trauma/Sedation Med Maxi Warner MD   2,500 Units at 08/14/18 2316     insulin aspart U-100 injection (NovoLOG)   Subcutaneous Q4H Lizbeth Arciniega CNP         insulin regular 1 Units/mL in sodium chloride 0.9% 250 mL  0.5-20 Units/hr Intravenous PRN Lizbeth Arciniega CNP         levothyroxine SolR 75 mcg  75 mcg Intravenous Daily 0600 Lizbeth Arciniega CNP   75 mcg at 08/15/18 0509     lidocaine (PF) 10 mg/mL (1 %) injection 1-5 mL (XYLOCAINE-MPF)  1-5 mL Intradermal Once PRN Wagner Sharma MD         naloxone injection 0.2-0.4 mg (NARCAN)  0.2-0.4 mg Intravenous PRN Lizbeth Arciniega CNP        Or     naloxone injection 0.2-0.4 mg (NARCAN)  0.2-0.4 mg Intramuscular PRN Lizbeth Arciniega, RL         norepinephrine 4 mg/250 ml in NS (0.016 mg/ml)  2-30 mcg/min Intravenous Continuous Wagner Sharma MD   Stopped at 08/15/18 0745     norepinephrine 8 mg/250 ml in NS (0.032 mg/ml)  2-30  mcg/min Intravenous Continuous Lizbeth Arciniega CNP 41.3 mL/hr at 08/15/18 1035 22 mcg/min at 08/15/18 1035     pantoprazole 80 mg in sodium chloride 0.9% 100 mL (PROTONIX) infusion  8 mg/hr Intravenous Continuous Brown Marrero MD         piperacillin-tazobactam 3.375 g in NaCl 0.9 % 50 mL (MINI-BAG Plus) (ZOSYN)  3.375 g Intravenous Q8H Lizbeth Arciniega CNP 12.5 mL/hr at 08/15/18 0554 3.375 g at 08/15/18 0554     polyvinyl alcohol 1.4 % ophthalmic solution 1-2 drop (LIQUIFILM TEARS)  1-2 drop Both Eyes Q1H PRN Lizbeth Arciniega CNP         senna-docusate 8.6-50 mg tablet 1 tablet (PERICOLACE)  1 tablet Oral BID Lizbeth Arciniega CNP        Or     sennosides syrup 8.8 mg (for SENOKOT)  8.8 mg Enteral Tube BID Lizbeth Arciniega CNP         sodium bicarbonate 150 mEq in dextrose 5% 1,000 mL   Intravenous Continuous Lizbeth Arciniega  mL/hr at 08/15/18 0600       sodium chloride 0.9% 100-500 mL  100-500 mL Intravenous PRN Brown Bustos DO         sodium chloride flush 10-20 mL (NS)  10-20 mL Intravenous PRN Scot Dixon MD         sodium chloride flush 10-30 mL (NS)  10-30 mL Intravenous PRN Scot Dixon MD         sodium chloride flush 10-30 mL (NS)  10-30 mL Intravenous Q8H FIXED TIMES Scot Dixon MD   10 mL at 08/15/18 0004     sodium chloride flush 20 mL (NS)  20 mL Intravenous PRN Scot Dixon MD         sodium chloride flush 3 mL (NS)  3 mL Intravenous Line Care Wagner Sharma MD   3 mL at 08/15/18 0900     vancomycin intermittent dosing   Other Med Consult or Protocol Lizbeth Arciniega CNP         vasopressin standard infusion 20 units in D5W 100 mL  2.4 Units/hr Intravenous Continuous Lizbeth Arciniega CNP 12 mL/hr at 08/15/18 0844 2.4 Units/hr at 08/15/18 0844       REVIEW OF SYMPTOMS:  Unable to complete ROS as patient is unresponsive    PPS:   10%- 1. Totally bed bound; 2. Unable to do any activity, extensive disease; 3. Total care; 4. Mouth care only; 5. Drowsy or coma +/- confusion  "    PHYSICAL EXAMINATION:    /49  Pulse (!) 51  Resp 22  Ht 5' 10\" (1.778 m)  Wt 178 lb (80.7 kg)  SpO2 91%  BMI 25.54 kg/m2  General appearance: intubated and unresponsive  Head: Normocephalic, without obvious abnormality  Eyes: negative findings: lids and lashes normal  Nose: no discharge  Throat: ET tube in place  Neck: supple, symmetrical, trachea midline  Lungs: clear to auscultation bilaterally  Heart: regular rate and rhythm, S1, S2 normal, no murmur, click, rub or gallop  Abdomen: Abdomen slightly distended.  Active bowel sounds  Extremities: Extremities, hands, feet are cool to touch.  No edema noted  Pulses: 2+ and symmetric  Skin: Hands and feet are dusky  Neurologic: Does not respond to verbal or tactile stimuli.    LAB:    Results from last 7 days  Lab Units 08/15/18  1129 08/15/18  0400 08/14/18 1915   LN-WHITE BLOOD CELL COUNT thou/uL  --  3.1* 13.5*   LN-HEMOGLOBIN g/dL 7.5* 9.5* 13.0*   LN-HEMATOCRIT %  --  30.1* 41.0   LN-PLATELET COUNT thou/uL  --  141 260          Results from last 7 days  Lab Units 08/15/18  0400 08/14/18  2221 08/14/18 1915   LN-SODIUM mmol/L 134* 126* 123*   LN-POTASSIUM mmol/L 4.1 6.6* 7.3*   LN-CHLORIDE mmol/L 98 99 89*   LN-CO2 mmol/L 20* 7* 8*   LN-BLOOD UREA NITROGEN mg/dL 22 68* 75*   LN-CREATININE mg/dL 1.07 2.76* 3.14*   LN-CALCIUM mg/dL 7.4* 8.2* 9.7   LN-ALBUMIN g/dL 2.6* 2.0*  --    LN-PROTEIN TOTAL g/dL 4.9* 4.8*  --    LN-BILIRUBIN TOTAL mg/dL 1.9* 1.6*  --    LN-ALKALINE PHOSPHATASE U/L 251* 219*  --    LN-ALT (SGPT) U/L 2210* 1654*  --    LN-AST (SGOT) U/L 6117* 3830*  --          Results from last 7 days  Lab Units 08/15/18  0401 08/14/18  2228 08/14/18  1915   LN-INR  2.68* 7.02* 4.46*         I have personally reviewed all pertinent labs.    RADIOLOGIC FINDINGS:  XR CHEST 1 VIEW PORTABLE  8/15/2018 8:27 AM  INDICATION: Check lines postop.  COMPARISON: 8/14/2018  FINDINGS: Patient remains intubated with endotracheal tube in good position. There " is a new right IJ dialysis catheter with its tip in the distal SVC just distal to the tip of the right upper extremity PICC line. Right-sided chest tube remains in place.   No evidence of a pneumothorax. Small loculated effusion along the right lateral base is unchanged. Slight right hilar fullness is also unchanged. Left lung is clear. Heart and pulmonary vascularity are normal     XR CHEST 1 VIEW FOR PICC LINE PLACEMENT PORTABLE  8/14/2018 10:12 PM  INDICATION: Verify catheter placement  COMPARISON: None.  FINDINGS: Right PICC with tip at the cavoatrial junction in good location. Mild worsening interstitial infiltrate right lung with small right pleural effusion. Stable right chest tube and endotracheal tube.    CARDIOLOGY:  ECHO results pending    EKG 8/14/2018   Atrial flutter with variable A-V block   Low voltage QRS   Nonspecific ST and T wave abnormality   Prolonged QT   Abnormal ECG   When compared with ECG of 14-AUG-2018 20:10,   Questionable change in QRS duration             Anca Hardy, [facility]  Office #: [phone]    Total Time 80 with > 50% of time during encounter was spent with family and patient on counseling and/or care coordination as documented above. yes

## 2021-06-19 NOTE — CONSULTS
"RENAL CONSULTATION:    Date of Consultation:  8/14/2018    Requesting Physician: Camille Arciniega    Reason for Consult:  Hyperkalemia MINERVA    History of present illness:  Pt was recently hospitalized at [facility] from July 11 until last Friday the 10th of August.  During this hsopitalization he was found to have lung cancer and a pleurovac tube in place. He also had \"a colon infection\" the family thinks c difficile.  He had a GI bleed and what sounds like a systemic infection.  He was discharged to rehabilitation but shortly after discharge became weaker.  [facility]  They were able to make in drain to some extent and sent him back to the nursing home.    Today his family noted that he was very sick shortness of breath and weak when they visited him.     According to his grandson he ate dinner this evening but was having difficulty after eating with coughing. He later became sweaty and complained of light chest pains and shortness of breath.  EMT was called and they noted that he also complained of shortness of breath and chest pain.  He became unresponsive on the way to the hospital.     In the ED he was found to have minerva with creatinine in high 3 range, hyperkalemia k 7.3, acidosis, elevated lactate, leucocytosis.  He was given iv fluid, hyperkalemia protocol and started on antibiotics transferred to the icu.     PMH  CAD, has had stent  Afib on coumadin  Prostate cancer on chemotherapy: enzalutamide 160 mg per day    Meds  albuteral  symbicort  Calcium with D  Citalopram  Cardizem ck 120 mg per day  Colace  Imdur 60 mg per day  Synthyroid 125 mcg pr day  Protonix 40  pravachol 10 mg per day  Senna  Coumadin.      Allergy:  Lisinopril  Neomycin  Polymyxin  Simvastatin  Etodolac      No past medical history on file.    Medications: Scheduled Meds:    dextrose 50 % (D50W)  50 mL Intravenous Once     enzalutamide  160 mg Oral QPM     pantoprazole  40 mg " Intravenous Q12H     piperacillin-tazobactam  3.375 g Intravenous Once     piperacillin-tazobactam  3.375 g Intravenous Q8H     [START ON 8/15/2018] sodium chloride  3 mL Intravenous Line Care     vancomycin  20 mg/kg Intravenous Once     vancomycin intermittent dosing   Other Med Consult or Protocol     Continuous Infusions:    insulin infusion (1 unit/mL)       norepinephrine IV infusion in NS 8 mcg/min (08/14/18 2040)     sodium bicarbonate IV infusion in D5W       sodium chloride 0.9%       vasopressin       PRN Meds:.albuterol, dextrose 50 % (D50W), insulin regular, lidocaine (PF)    Allergies   Allergen Reactions     Etodolac-Capsaicin-Me-Sal-Men      Lisinopril      Neomycin      Polymyxin B      Simvastatin        Social History     Social History     Marital status:      Spouse name: N/A     Number of children: N/A     Years of education: N/A     Occupational History     Not on file.     Social History Main Topics     Smoking status: Not on file     Smokeless tobacco: Not on file     Alcohol use Not on file     Drug use: Not on file     Sexual activity: Not on file     Other Topics Concern     Not on file     Social History Narrative       Family History:      Review of Systems Unable     BP 94/52  Pulse 82  Resp (!) 33  Wt 178 lb (80.7 kg)  SpO2 100%    Intake/Output Summary (Last 24 hours) at 08/14/18 2244  Last data filed at 08/14/18 2053   Gross per 24 hour   Intake             3000 ml   Output                0 ml   Net             3000 ml     Physical Exam:   GENERAL: calm and comfortable, unresponsive, sedated on vent  HEENT: pupils equal, sclerae not icteric.  RESP: Clear on left, ronchi on right, .  CV: RRR, no murmurs. no leg edema.    GI: decreased  BS, Soft, liver is enlarged  Musculoskeletal: Normal muscle bulk/ tone; No gross joint abnormalities  SKIN: No rash, warm/ dry  PSYCH: Unresponsive.   Lymph: No cervical/ inguinal adenopathy    LABS:    Lactate is 12.     WBC 13,500, hgb is  13,  Ph 6.94, pco2 28 bicarb 5.6    CXR : right lung interstitial infiltrate and left lung clear, chest tube in right lung.     Results from last 7 days  Lab Units 08/14/18 1915   LN-SODIUM mmol/L 123*   LN-POTASSIUM mmol/L 7.3*   LN-CHLORIDE mmol/L 89*   LN-CO2 mmol/L 8*   LN-BLOOD UREA NITROGEN mg/dL 75*   LN-CREATININE mg/dL 3.14*   LN-CALCIUM mg/dL 9.7       Results from last 7 days  Lab Units 08/14/18 1915   LN-WHITE BLOOD CELL COUNT thou/uL 13.5*   LN-HEMOGLOBIN g/dL 13.0*   LN-HEMATOCRIT % 41.0   LN-PLATELET COUNT thou/uL 260           Assessment/ Recommendations:  Acute renal failure with hyperkalemia and acidosis:  The betsy is due to his shock, possibly due to sepsis: if he will tolerate it will need dialysis rx this evening.  I called the on call dialysis nurse and have written dialysis orders.     Recently discovered to have lung cancer. Details not available.  Has pleurovac device in right chest, was draining 1000 cc per day until the past two days when it did not drain.     Shock: Pt on levophed, mean bp in the  Low  60's, lactate elevated at 12, anon gap 26 , extremities warm.  Suspect sepsis. Source not clear.  Has been started on broad spectrum antibiotics, iv fluid boluses bicarbonate drip. Levophed.   Will need further diagnostic work up if he can be stabilized. It appears to me that given age and diagnoses his prognosis is poor       REspiratory failure: intubated and sedated    Acute liver injury:  AST 3800, alt 1654, bili 1.6 inr 7.0  Likely secondary to shock.  Liver is also quite enlarged.      CAD; history of coronary stent    Afib: longstanding issue, on coumidin and diltiazem.     Recent GI bleed at [facility] while hospitalized from July 11 to August 10. Wife says that the vessel was clipped. On protonix two times a day.     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         Chet Lino MD  [facility]

## 2021-06-26 NOTE — PROGRESS NOTES
Progress Notes by Brown Bustos DO at 8/15/2018 11:48 AM     Author: Brown Bustos DO Service: Nephrology Author Type: Physician    Filed: 8/15/2018 12:02 PM Date of Service: 8/15/2018 11:48 AM Status: Signed    : Brown Bustos DO (Physician)              RENAL (KS) progress note  CC: F/U MINERVA, hyperkalemia  S: Patient remains intubated and sedated in ICU. Patient underwent HD early this am for hyperkalemia. Unable to obtain ROS secondary to intubation and sedation.     A/P:   Principal Problem:    Hyperkalemia  Active Problems:    MINERVA (acute kidney injury) (H)    Acute on chronic respiratory failure, unspecified whether with hypoxia or hypercapnia (H)    Acidosis    Hypotension, unspecified hypotension type    Severe sepsis with septic shock (CODE) (H)    #Acute anuric renal failure w/ hyperkalemia and acidosis: Secondary to ATN. The minerva is due to septic shock. No urine output. Patient with creatinine of 2.76 on admission. IR placed dialysis cath on 8/14 with emergent HD on morning of 8/15. Patient remains on two pressors.   -HD today SLED vs IHD for refractory acidosis based on what patient can tolerate.   -Did discussed with ICU team regarding transfer for CRRT.   -Watch volume status with anuric renal failure. Agree with backing off on fluids with elevated CVP. IVC without much variation.     #AGMA: Secondary to lactic acidosis. Last Blood gas pH of 7.06. Bicarb of 8.   -HD today for acidosis.     #Hyperkalemia: K of 7.2 on admission and shifted to 6.6. Improved after HD to 4.1.      #Recently discovered to have lung cancer.  Has pleurovac device in right chest, was draining 1000 cc per day until the past two days when it did not drain.      #Septic Shock: Pt on levophed and vaso,lactate elevated at 12 and improved to 9 this am, anon gap 16 this am(not corrected for albumin) , extremities cool.  Suspect sepsis. Source not clear but concern for aspiration.  Has been started on broad  "spectrum antibiotics, iv fluid boluses bicarbonate drip.     #Acute hypoxic Rspiratory failure: intubated and sedated     #Acute liver injury: LFT's continue to trend up.  Likely secondary to shock.      #Afib: longstanding issue, on coumidin and diltiazem.      #Recent GI bleed at [facility] while hospitalized from July 11 to August 10. Wife says that the vessel was clipped. On protonix two times a day. Blood returning from NG tube. Concern for GI bleeding.      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     Brown Fontana [facility]  [facility]  [phone] (off)  [phone] (Pager)      No interval changes to past medical history, social history or family history to report.    BP 99/55  Pulse 68  Resp 22  Ht 5' 10\" (1.778 m)  Wt 178 lb (80.7 kg)  SpO2 95%  BMI 25.54 kg/m2    I/O last 3 completed shifts:  In: 4651.4 [I.V.:4475.2; IV Piggyback:176.2]  Out: 0     Physical Exam:   GENERAL: Intubated and sedated.   EYES: sclerae not icteric.  ENT:  oral mucosa moist   RESP: Clear on left. Course on right  CV: RRR, no murmurs. No leg edema.    GI: decreased BS, Soft, NT/ND  SKIN: No rash, skin cool  NEURO:Intubated and sedated.   PSYCH: unable to assess.       Results from last 7 days  Lab Units 08/15/18  0400 08/14/18  2221 08/14/18  1915   LN-SODIUM mmol/L 134* 126* 123*   LN-POTASSIUM mmol/L 4.1 6.6* 7.3*   LN-CHLORIDE mmol/L 98 99 89*   LN-CO2 mmol/L 20* 7* 8*   LN-BLOOD UREA NITROGEN mg/dL 22 68* 75*   LN-CREATININE mg/dL 1.07 2.76* 3.14*   LN-CALCIUM mg/dL 7.4* 8.2* 9.7   LN-ALBUMIN g/dL 2.6* 2.0*  --    LN-PROTEIN TOTAL g/dL 4.9* 4.8*  --    LN-BILIRUBIN TOTAL mg/dL 1.9* 1.6*  --    LN-ALKALINE PHOSPHATASE U/L 251* 219*  --    LN-ALT (SGPT) U/L 2210* 1654*  --    LN-AST (SGOT) U/L 6117* 3830*  --        Results from last 7 days  Lab Units 08/15/18  1129 08/15/18  0400 08/14/18  1915   LN-WHITE BLOOD CELL " COUNT thou/uL  --  3.1* 13.5*   LN-HEMOGLOBIN g/dL 7.5* 9.5* 13.0*   LN-HEMATOCRIT %  --  30.1* 41.0   LN-PLATELET COUNT thou/uL  --  141 260         Scheduled Meds:  ? chlorhexidine  15 mL Topical Q12H   ? dextrose 50 % (D50W)  50 mL Intravenous Once   ? insulin aspart (NovoLOG) injection   Subcutaneous Q4H   ? levothyroxine  75 mcg Intravenous Daily 0600   ? piperacillin-tazobactam  3.375 g Intravenous Q8H   ? senna-docusate  1 tablet Oral BID    Or   ? senna (SENOKOT) syrup  8.8 mg Enteral Tube BID   ? sodium chloride  10-30 mL Intravenous Q8H FIXED TIMES   ? sodium chloride  3 mL Intravenous Line Care   ? vancomycin intermittent dosing   Other Med Consult or Protocol     Continuous Infusions:  ? dextrose 10% 50 mL/hr (08/15/18 0615)   ? fentaNYL 2500 mcg/250 mL in NS (10 mcg/mL) Stopped (08/15/18 0730)   ? norepinephrine IV infusion in NS Stopped (08/15/18 0745)   ? norepinephrine IV infusion in NS 22 mcg/min (08/15/18 1035)   ? pantoprozole (PROTONIX) infusion     ? sodium bicarbonate IV infusion in D5W 125 mL/hr at 08/15/18 0600   ? vasopressin 2.4 Units/hr (08/15/18 0844)     PRN Meds:.albuterol, bisacodyl, dextrose 50 % (D50W), dextrose 50 % (D50W), fentaNYL pf, glucagon (human recombinant), heparin, insulin infusion (1 unit/mL), lidocaine (PF), naloxone **OR** naloxone, polyvinyl alcohol, sodium chloride 0.9%, sodium chloride, sodium chloride, sodium chloride      Labs personally reviewed today during this evaluation at 11:48 AM